# Patient Record
Sex: MALE | Race: WHITE | NOT HISPANIC OR LATINO | Employment: STUDENT | URBAN - NONMETROPOLITAN AREA
[De-identification: names, ages, dates, MRNs, and addresses within clinical notes are randomized per-mention and may not be internally consistent; named-entity substitution may affect disease eponyms.]

---

## 2018-04-16 ENCOUNTER — HOSPITAL ENCOUNTER (EMERGENCY)
Facility: HOSPITAL | Age: 23
Discharge: HOME | End: 2018-04-16
Attending: EMERGENCY MEDICINE | Admitting: EMERGENCY MEDICINE

## 2018-04-16 VITALS
DIASTOLIC BLOOD PRESSURE: 64 MMHG | RESPIRATION RATE: 16 BRPM | HEART RATE: 71 BPM | BODY MASS INDEX: 22.12 KG/M2 | OXYGEN SATURATION: 100 % | HEIGHT: 71 IN | WEIGHT: 158 LBS | SYSTOLIC BLOOD PRESSURE: 115 MMHG | TEMPERATURE: 98.7 F

## 2018-04-16 DIAGNOSIS — J06.9 VIRAL UPPER RESPIRATORY TRACT INFECTION: Primary | ICD-10-CM

## 2018-04-16 DIAGNOSIS — J30.9 ACUTE ALLERGIC RHINITIS, UNSPECIFIED SEASONALITY, UNSPECIFIED TRIGGER: ICD-10-CM

## 2018-04-16 LAB — S PYO AG THROAT QL: NEGATIVE

## 2018-04-16 PROCEDURE — 87081 CULTURE SCREEN ONLY: CPT | Performed by: EMERGENCY MEDICINE

## 2018-04-16 PROCEDURE — 87880 STREP A ASSAY W/OPTIC: CPT | Performed by: EMERGENCY MEDICINE

## 2018-04-16 PROCEDURE — 99283 EMERGENCY DEPT VISIT LOW MDM: CPT

## 2018-04-16 NOTE — ED PROVIDER NOTES
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  Some of his college roommates have similar symptoms.  He is a nonsmoker and typically very healthy.  No fever or chills myalgias, shortness of air or wheezing.  No rash, vomiting, diarrhea.  He has used some equate allergy medicine over-the-counter.  No recent out of country travel.  Minor scratchy sore throat with the postnasal drainage.            Review of Systems   All other systems reviewed and are negative.      History reviewed. No pertinent past medical history.    No Known Allergies    History reviewed. No pertinent surgical history.    History reviewed. No pertinent family history.    Social History     Social History   • Marital status: Single     Social History Main Topics   • Smoking status: Never Smoker   • Smokeless tobacco: Never Used   • Alcohol use No   • Drug use: No     Other Topics Concern   • Not on file           Objective   Physical Exam   Constitutional: He appears well-developed and well-nourished. No distress.   HENT:   Head: Normocephalic and atraumatic.   Right Ear: External ear normal.   Left Ear: External ear normal.   Nose: Nose normal.   Mouth/Throat: Oropharynx is clear and moist. No oropharyngeal exudate.   No tenderness over the frontal or maxillary sinuses, face is nonswollen   Eyes: Conjunctivae and EOM are normal. Right eye exhibits no discharge. Left eye exhibits no discharge. No scleral icterus.   Neck: Normal range of motion.   Cardiovascular: Normal rate and regular rhythm.    Pulmonary/Chest: Effort normal and breath sounds normal. No respiratory distress. He has no wheezes. He has no rales.   Abdominal: He exhibits no distension.   Musculoskeletal: Normal range of motion. He exhibits no edema.   Lymphadenopathy:     He has no cervical adenopathy.   Neurological: He is alert. No cranial  nerve deficit. He exhibits normal muscle tone. Coordination normal.   Skin: Skin is warm and dry. Capillary refill takes less than 2 seconds. No rash noted. He is not diaphoretic.   Psychiatric: He has a normal mood and affect. His behavior is normal. Thought content normal.       Procedures         ED Course  ED Course                  MDM    Final diagnoses:   Viral upper respiratory tract infection   Acute allergic rhinitis, unspecified seasonality, unspecified trigger            Luis Carlos Cota PA-C  04/16/18 1928

## 2018-04-16 NOTE — DISCHARGE INSTRUCTIONS
Try to get as much rest as possible over the next week or 2 and increase her water consumption to at least 10 glasses of water a day.  You may use Sudafed-pseudoephedrine for nasal congestion as needed, Zyrtec over-the-counter for any allergies or usually existing allergy medication.  Delsym as needed for any cough.  If your symptoms persist beyond another 10 days make sure you have reevaluation by your doctor or any other health provider

## 2018-04-18 LAB — BACTERIA SPEC AEROBE CULT: NORMAL

## 2018-10-04 ENCOUNTER — HOSPITAL ENCOUNTER (EMERGENCY)
Facility: HOSPITAL | Age: 23
Discharge: HOME | End: 2018-10-04
Attending: EMERGENCY MEDICINE | Admitting: EMERGENCY MEDICINE

## 2018-10-04 VITALS
SYSTOLIC BLOOD PRESSURE: 119 MMHG | OXYGEN SATURATION: 96 % | RESPIRATION RATE: 16 BRPM | TEMPERATURE: 99.1 F | WEIGHT: 159.4 LBS | DIASTOLIC BLOOD PRESSURE: 70 MMHG | BODY MASS INDEX: 22.31 KG/M2 | HEIGHT: 71 IN | HEART RATE: 98 BPM

## 2018-10-04 DIAGNOSIS — J06.9 UPPER RESPIRATORY TRACT INFECTION, UNSPECIFIED TYPE: ICD-10-CM

## 2018-10-04 DIAGNOSIS — J02.9 PHARYNGITIS, UNSPECIFIED ETIOLOGY: Primary | ICD-10-CM

## 2018-10-04 LAB — S PYO AG THROAT QL: NEGATIVE

## 2018-10-04 PROCEDURE — 87880 STREP A ASSAY W/OPTIC: CPT | Performed by: EMERGENCY MEDICINE

## 2018-10-04 PROCEDURE — 87081 CULTURE SCREEN ONLY: CPT | Performed by: EMERGENCY MEDICINE

## 2018-10-04 PROCEDURE — 99283 EMERGENCY DEPT VISIT LOW MDM: CPT

## 2018-10-04 RX ORDER — FLUTICASONE PROPIONATE 50 MCG
2 SPRAY, SUSPENSION (ML) NASAL DAILY
Qty: 16 G | Refills: 0 | Status: SHIPPED | OUTPATIENT
Start: 2018-10-04 | End: 2019-11-06

## 2018-10-04 RX ORDER — FEXOFENADINE HCL AND PSEUDOEPHEDRINE HCI 180; 240 MG/1; MG/1
1 TABLET, EXTENDED RELEASE ORAL DAILY
Qty: 10 TABLET | Refills: 0 | Status: SHIPPED | OUTPATIENT
Start: 2018-10-04 | End: 2020-06-22 | Stop reason: SDDI

## 2018-10-04 RX ORDER — BENZONATATE 100 MG/1
200 CAPSULE ORAL 3 TIMES DAILY PRN
Qty: 30 CAPSULE | Refills: 0 | Status: SHIPPED | OUTPATIENT
Start: 2018-10-04 | End: 2018-10-09

## 2018-10-04 NOTE — ED PROVIDER NOTES
Subjective   This patient's had a sore throat, nasal congestion, cough and developed hoarseness today.  His symptoms were going on since Monday.  He is only taking Tylenol prior to arrival.  He denies fever.  He states there is someone on campus who has mononucleosis however he has not had much contact with them.            Review of Systems   All other systems reviewed and are negative.      History reviewed. No pertinent past medical history.    No Known Allergies    History reviewed. No pertinent surgical history.    History reviewed. No pertinent family history.    Social History     Social History   • Marital status: Single     Social History Main Topics   • Smoking status: Never Smoker   • Smokeless tobacco: Never Used   • Alcohol use No   • Drug use: No     Other Topics Concern   • Not on file           Objective   Physical Exam   Constitutional: He appears well-developed and well-nourished.   HENT:   Head: Normocephalic and atraumatic.   Right Ear: External ear normal. Tympanic membrane is not injected, not erythematous and not bulging.   Left Ear: External ear normal. Tympanic membrane is not injected, not erythematous and not bulging.   Mouth/Throat: Uvula is midline. Posterior oropharyngeal erythema present. No oropharyngeal exudate, posterior oropharyngeal edema or tonsillar abscesses. Tonsils are 0 on the right. Tonsils are 0 on the left.   Neck: Normal range of motion and full passive range of motion without pain.   Cardiovascular: Normal rate and regular rhythm.    Pulmonary/Chest: Effort normal and breath sounds normal. He has no wheezes.   Musculoskeletal: Normal range of motion.   Lymphadenopathy:     He has no cervical adenopathy.   Neurological: He is alert.   Skin: No rash noted.   Psychiatric: He has a normal mood and affect. His behavior is normal. Judgment and thought content normal.       Procedures           ED Course                  MDM      Final diagnoses:   Pharyngitis, unspecified  etiology   Upper respiratory tract infection, unspecified type            Ruben Erazo PA-C  10/04/18 1412       Ruben Erazo PA-C  10/04/18 1443

## 2018-10-06 LAB — BACTERIA SPEC AEROBE CULT: NORMAL

## 2019-04-04 ENCOUNTER — HOSPITAL ENCOUNTER (EMERGENCY)
Facility: HOSPITAL | Age: 24
Discharge: HOME | End: 2019-04-04
Attending: EMERGENCY MEDICINE | Admitting: EMERGENCY MEDICINE

## 2019-04-04 VITALS
TEMPERATURE: 98.3 F | DIASTOLIC BLOOD PRESSURE: 68 MMHG | SYSTOLIC BLOOD PRESSURE: 109 MMHG | HEART RATE: 102 BPM | RESPIRATION RATE: 16 BRPM | HEIGHT: 71 IN | WEIGHT: 155 LBS | OXYGEN SATURATION: 96 % | BODY MASS INDEX: 21.7 KG/M2

## 2019-04-04 DIAGNOSIS — B34.9 VIRAL ILLNESS: Primary | ICD-10-CM

## 2019-04-04 LAB
FLUAV AG NPH QL: NEGATIVE
FLUBV AG NPH QL IA: NEGATIVE
S PYO AG THROAT QL: NEGATIVE

## 2019-04-04 PROCEDURE — 87880 STREP A ASSAY W/OPTIC: CPT | Performed by: PHYSICIAN ASSISTANT

## 2019-04-04 PROCEDURE — 87804 INFLUENZA ASSAY W/OPTIC: CPT | Performed by: PHYSICIAN ASSISTANT

## 2019-04-04 PROCEDURE — 99283 EMERGENCY DEPT VISIT LOW MDM: CPT

## 2019-04-04 PROCEDURE — 87081 CULTURE SCREEN ONLY: CPT | Performed by: PHYSICIAN ASSISTANT

## 2019-04-04 RX ORDER — ACETAMINOPHEN 325 MG/1
650 TABLET ORAL ONCE
Status: COMPLETED | OUTPATIENT
Start: 2019-04-04 | End: 2019-04-04

## 2019-04-04 RX ORDER — ONDANSETRON 4 MG/1
4 TABLET, ORALLY DISINTEGRATING ORAL EVERY 8 HOURS PRN
Qty: 8 TABLET | Refills: 0 | Status: SHIPPED | OUTPATIENT
Start: 2019-04-04 | End: 2019-11-06

## 2019-04-04 RX ORDER — IBUPROFEN 600 MG/1
600 TABLET ORAL EVERY 8 HOURS PRN
Qty: 12 TABLET | Refills: 0 | Status: SHIPPED | OUTPATIENT
Start: 2019-04-04 | End: 2019-11-06

## 2019-04-04 RX ORDER — SENNOSIDES 8.6 MG
650 CAPSULE ORAL EVERY 8 HOURS PRN
Qty: 12 TABLET | Refills: 0 | Status: SHIPPED | OUTPATIENT
Start: 2019-04-04 | End: 2019-11-06

## 2019-04-04 RX ADMIN — ACETAMINOPHEN 650 MG: 325 TABLET ORAL at 13:00

## 2019-04-04 NOTE — ED PROVIDER NOTES
Subjective   Patient is here with complaint of some subjective fever myalgias some sore throat intermittently for the past 2 days patient says he was around someone who did test positive for influenza.  He denies any chest pain shortness of air no abdominal pain no nausea no vomiting occasional cough reported no hemoptysis reported no other systemic complaints        History provided by:  Patient      Review of Systems   Constitutional: Positive for fever.   HENT: Positive for congestion and sore throat.    Eyes: Negative.    Respiratory: Positive for cough. Negative for shortness of breath.    Cardiovascular: Negative.    Gastrointestinal: Negative.  Negative for abdominal pain, nausea and vomiting.   Genitourinary: Negative.    Musculoskeletal: Positive for myalgias. Negative for neck pain and neck stiffness.   Skin: Negative.    Neurological: Negative.    Psychiatric/Behavioral: Negative.    All other systems reviewed and are negative.      History reviewed. No pertinent past medical history.    No Known Allergies    History reviewed. No pertinent surgical history.    History reviewed. No pertinent family history.    Social History     Socioeconomic History   • Marital status: Single     Spouse name: Not on file   • Number of children: Not on file   • Years of education: Not on file   • Highest education level: Not on file   Tobacco Use   • Smoking status: Never Smoker   • Smokeless tobacco: Never Used   Substance and Sexual Activity   • Alcohol use: No   • Drug use: No           Objective   Physical Exam   Constitutional: He is oriented to person, place, and time. He appears well-developed and well-nourished.   Afebrile nontoxic no acute distress   HENT:   Head: Normocephalic and atraumatic.   Right Ear: External ear normal.   Left Ear: External ear normal.   Mouth/Throat: Oropharynx is clear and moist.   Eyes: Conjunctivae and EOM are normal. Pupils are equal, round, and reactive to light.   Neck: Normal range  of motion. Neck supple.   No meningismus   Cardiovascular: Regular rhythm, normal heart sounds and intact distal pulses.   Pulmonary/Chest: Effort normal and breath sounds normal.   Abdominal: Soft. Bowel sounds are normal. There is no tenderness.   Musculoskeletal: Normal range of motion. He exhibits no edema.   Lymphadenopathy:     He has no cervical adenopathy.   Neurological: He is alert and oriented to person, place, and time. No cranial nerve deficit or sensory deficit. He exhibits normal muscle tone. Coordination normal.   Skin: Skin is warm and dry. Capillary refill takes less than 2 seconds.   Psychiatric: He has a normal mood and affect. His behavior is normal. Judgment and thought content normal.   Nursing note and vitals reviewed.      Procedures           ED Course  ED Course as of Apr 04 1324   Thu Apr 04, 2019   1303 Patient sitting up resting comfortably no distress  [SC]   1317 Patient's influenza and strep test negative however patient symptoms likely viral, with exposure to someone with influenza will plan on discharging home school excuse for today and tomorrow prescriptions for Tylenol Motrin as needed patient to follow-up with PCP or return here if there is any worsening symptoms pains fevers concerns despite medication patient agreeable with plan  [SC]      ED Course User Index  [SC] Nirav Morgan PA-C                  [facility]  Number of Diagnoses or Management Options     Amount and/or Complexity of Data Reviewed  Review and summarize past medical records: yes  Discuss the patient with other providers: yes    Risk of Complications, Morbidity, and/or Mortality  Presenting problems: low  Diagnostic procedures: low  Management options: low          Final diagnoses:   Viral illness            Nirav Morgan PA-C  04/04/19 [id number]

## 2019-04-04 NOTE — DISCHARGE INSTRUCTIONS
Rest increase fluids water, juices, take Tylenol and/or Motrin as needed for body aches, return to the ER if you have any worsening symptoms or concerns despite medication

## 2019-04-06 LAB — BACTERIA SPEC AEROBE CULT: NORMAL

## 2019-04-23 ENCOUNTER — HOSPITAL ENCOUNTER (EMERGENCY)
Facility: HOSPITAL | Age: 24
Discharge: HOME | End: 2019-04-23
Attending: EMERGENCY MEDICINE | Admitting: EMERGENCY MEDICINE

## 2019-04-23 ENCOUNTER — APPOINTMENT (OUTPATIENT)
Dept: ULTRASOUND IMAGING | Facility: HOSPITAL | Age: 24
End: 2019-04-23

## 2019-04-23 VITALS
HEIGHT: 71 IN | DIASTOLIC BLOOD PRESSURE: 68 MMHG | WEIGHT: 157.2 LBS | RESPIRATION RATE: 16 BRPM | SYSTOLIC BLOOD PRESSURE: 118 MMHG | HEART RATE: 81 BPM | BODY MASS INDEX: 22.01 KG/M2 | OXYGEN SATURATION: 99 % | TEMPERATURE: 98 F

## 2019-04-23 DIAGNOSIS — N45.1 EPIDIDYMITIS: Primary | ICD-10-CM

## 2019-04-23 LAB
BILIRUB UR QL STRIP: NEGATIVE
CLARITY UR: CLEAR
COLOR UR: YELLOW
GLUCOSE UR STRIP-MCNC: NEGATIVE MG/DL
HGB UR QL STRIP.AUTO: NEGATIVE
KETONES UR QL STRIP: NEGATIVE
LEUKOCYTE ESTERASE UR QL STRIP.AUTO: NEGATIVE
NITRITE UR QL STRIP: NEGATIVE
PH UR STRIP.AUTO: 7 [PH] (ref 5–8)
PROT UR QL STRIP: NEGATIVE
SP GR UR STRIP: <=1.005 (ref 1–1.03)
UROBILINOGEN UR QL STRIP: NORMAL

## 2019-04-23 PROCEDURE — 96372 THER/PROPH/DIAG INJ SC/IM: CPT

## 2019-04-23 PROCEDURE — 87591 N.GONORRHOEAE DNA AMP PROB: CPT | Performed by: EMERGENCY MEDICINE

## 2019-04-23 PROCEDURE — 99283 EMERGENCY DEPT VISIT LOW MDM: CPT

## 2019-04-23 PROCEDURE — 81003 URINALYSIS AUTO W/O SCOPE: CPT | Performed by: EMERGENCY MEDICINE

## 2019-04-23 PROCEDURE — 25010000002 CEFTRIAXONE PER 250 MG: Performed by: EMERGENCY MEDICINE

## 2019-04-23 PROCEDURE — 87491 CHLMYD TRACH DNA AMP PROBE: CPT | Performed by: EMERGENCY MEDICINE

## 2019-04-23 PROCEDURE — 76870 US EXAM SCROTUM: CPT

## 2019-04-23 RX ORDER — CEFTRIAXONE SODIUM 250 MG/1
250 INJECTION, POWDER, FOR SOLUTION INTRAMUSCULAR; INTRAVENOUS ONCE
Status: COMPLETED | OUTPATIENT
Start: 2019-04-23 | End: 2019-04-23

## 2019-04-23 RX ORDER — DOXYCYCLINE HYCLATE 100 MG/1
100 CAPSULE ORAL 2 TIMES DAILY
Qty: 13 CAPSULE | Refills: 0 | Status: SHIPPED | OUTPATIENT
Start: 2019-04-23 | End: 2019-11-06

## 2019-04-23 RX ORDER — LIDOCAINE HYDROCHLORIDE 10 MG/ML
0.9 INJECTION, SOLUTION EPIDURAL; INFILTRATION; INTRACAUDAL; PERINEURAL ONCE
Status: COMPLETED | OUTPATIENT
Start: 2019-04-23 | End: 2019-04-23

## 2019-04-23 RX ORDER — DOXYCYCLINE 100 MG/1
100 CAPSULE ORAL ONCE
Status: COMPLETED | OUTPATIENT
Start: 2019-04-23 | End: 2019-04-23

## 2019-04-23 RX ADMIN — DOXYCYCLINE 100 MG: 100 CAPSULE ORAL at 13:57

## 2019-04-23 RX ADMIN — LIDOCAINE HYDROCHLORIDE 0.9 ML: 10 INJECTION, SOLUTION EPIDURAL; INFILTRATION; INTRACAUDAL; PERINEURAL at 13:56

## 2019-04-23 RX ADMIN — CEFTRIAXONE SODIUM 250 MG: 250 INJECTION, POWDER, FOR SOLUTION INTRAMUSCULAR; INTRAVENOUS at 13:57

## 2019-04-25 LAB
C TRACH RRNA SPEC DONR QL NAA+PROBE: NEGATIVE
N GONORRHOEA DNA SPEC QL NAA+PROBE: NEGATIVE

## 2020-02-20 ENCOUNTER — OFFICE VISIT (OUTPATIENT)
Dept: PSYCHIATRY | Facility: CLINIC | Age: 25
End: 2020-02-20

## 2020-02-20 VITALS
BODY MASS INDEX: 23.19 KG/M2 | HEART RATE: 83 BPM | DIASTOLIC BLOOD PRESSURE: 62 MMHG | SYSTOLIC BLOOD PRESSURE: 118 MMHG | WEIGHT: 162 LBS | HEIGHT: 70 IN

## 2020-02-20 DIAGNOSIS — F90.9 ADULT ADHD: Primary | ICD-10-CM

## 2020-02-20 PROCEDURE — 90792 PSYCH DIAG EVAL W/MED SRVCS: CPT | Performed by: NURSE PRACTITIONER

## 2020-02-20 RX ORDER — ATOMOXETINE 40 MG/1
40 CAPSULE ORAL DAILY
Qty: 30 CAPSULE | Refills: 0 | Status: SHIPPED | OUTPATIENT
Start: 2020-02-20 | End: 2020-06-22

## 2020-03-23 ENCOUNTER — OFFICE VISIT (OUTPATIENT)
Dept: PSYCHIATRY | Facility: CLINIC | Age: 25
End: 2020-03-23

## 2020-03-23 VITALS — BODY MASS INDEX: 22.9 KG/M2 | WEIGHT: 160 LBS | HEIGHT: 70 IN

## 2020-03-23 DIAGNOSIS — F90.9 ADULT ADHD: Primary | ICD-10-CM

## 2020-03-23 PROCEDURE — 99213 OFFICE O/P EST LOW 20 MIN: CPT | Performed by: NURSE PRACTITIONER

## 2020-03-23 RX ORDER — DEXTROAMPHETAMINE SACCHARATE, AMPHETAMINE ASPARTATE, DEXTROAMPHETAMINE SULFATE AND AMPHETAMINE SULFATE 1.25; 1.25; 1.25; 1.25 MG/1; MG/1; MG/1; MG/1
5 TABLET ORAL DAILY
Qty: 30 TABLET | Refills: 0 | Status: SHIPPED | OUTPATIENT
Start: 2020-03-23 | End: 2020-04-29 | Stop reason: SDUPTHER

## 2020-04-20 NOTE — PROGRESS NOTES
Subjective   Yobani Singer is a 24 y.o. male who presents today for initial evaluation     Chief Complaint:  Poor focus    History of Present Illness:  Accompanied by:Self  This is a 24-year-old male who presents for initial evaluation.  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  He said that he is concerned that he may have ADHD.  He said that he is getting A's and B's for the most part but that is he is gotten into more intense courses he had trouble focusing and listening and this in turn impact his grades.  He tells me that when he was in grade school he was always accused of not applying himself and he said that he thought he was but he would not get the best grades.  He said he would constantly be staring out the window.  He was easily bored and is still easily bored.  He said that he has been noticing sometimes with conversations with friends lately that he loses focus and they have to repeat what ever saying.  He went to high school at [employer] and graduated there and then has been going to  [employer].  His mom and dad live in [place] and he has a half-brother and half-sister there.  He said that he had a good upbringing and that they are very supportive.  He has a hard time with sleep and will take melatonin which does help.  He denies any symptoms of depression.  He said that he is been noticing lately that he feels much more anxious.  He thinks he feels anxious because he cannot focus and is starting to concern him.  He said that in high school he had similar problems but that the work was not very difficult for him and so he was able to accomplish it, but now is noticing more trouble doing that.      Current Psychiatric Medications:  None    Prior Psychiatric Medications:  None    Currently in Counseling or Therapy:  No    Prior Psychiatric Outpatient Care:  None    Prior Psychiatric Hospitalizations:  None    Previous Suicide Attempts:  Denies    Any family history of  suicide attempts:  Denies    Previous Self-Harming Behavior: Denies    Legal History, Arrests, or Incarcerations:  No current legal charges pending.      Violent Tendencies:  None    Developmental History:  Born in [place] and raised in [place].  His parents have been  and he had a very good upbringing.  As indicated he graduated from [employer] and is now in his kyler year at [employer].  He is studying clinical science and marketing.      History of Seizures or TBI:  Denies    Highest Level of Education:  Currently a kyler in college      Employment:  Part-time     History:  No    Social History:  Lives with 2 [employer] male roommates.  He does not smoke.  He will vape if he drinks alcohol and he drinks alcohol occasionally on weekends.  He denies any illicit drug usage.  He denies any history of narcotic abuse.      Last Menstrual Period:  NA    Abuse History:  Verbal: no  Emotional: no  Mental: no  Physical: no  Sexual: no  Rape: no  Other: Denies      Patient's Support Network Includes:  father, mother and Two good friends      The following portions of the patient's history were reviewed and updated as appropriate: allergies, current medications, past family history, past medical history, past social history, past surgical history and problem list.      Past Medical History:  History reviewed. No pertinent past medical history.    Social History:  Social History     Socioeconomic History   • Marital status: Single     Spouse name: Not on file   • Number of children: Not on file   • Years of education: Not on file   • Highest education level: Not on file   Tobacco Use   • Smoking status: Never Smoker   • Smokeless tobacco: Never Used   Substance and Sexual Activity   • Alcohol use: Yes   • Drug use: No       Family History:  History reviewed. No pertinent family history.    Past Surgical History:  History reviewed. No pertinent surgical history.    Problem List:  There  "is no problem list on file for this patient.      Allergy:   Allergies   Allergen Reactions   • Azithromycin Hives        Current Medications:   Current Outpatient Medications   Medication Sig Dispense Refill   • amphetamine-dextroamphetamine (Adderall) 5 MG tablet Take 1 tablet by mouth Daily. 30 tablet 0   • atomoxetine (STRATTERA) 40 MG capsule Take 1 capsule by mouth Daily. 30 capsule 0   • brompheniramine-pseudoephedrine-DM (BROMFED DM) 30-2-10 MG/5ML syrup Take 1 teaspoonful (5mL) by mouth 4 (Four) Times a Day As Needed for Congestion or Cough. 119 mL 0   • fexofenadine-pseudoephedrine (ALLEGRA-D 24) 180-240 MG per 24 hr tablet Take 1 tablet by mouth Daily. 10 tablet 0   • loratadine (CLARITIN) 10 MG tablet Take  by mouth.       No current facility-administered medications for this visit.        Review of Symptoms:    Review of Systems   Psychiatric/Behavioral: Positive for decreased concentration and sleep disturbance. Negative for self-injury, suicidal ideas and depressed mood. The patient is nervous/anxious.          Physical Exam:   Blood pressure 118/62, pulse 83, height 177.8 cm (70\"), weight 73.5 kg (162 lb). Body mass index is 23.24 kg/m².     Physical Exam   Constitutional: He appears well-developed and well-nourished.   Nursing note and vitals reviewed.        Mental Status Exam:   Hygiene:   good  Cooperation:  Cooperative  Eye Contact:  Good  Psychomotor Behavior:  Appropriate  Affect:  Appropriate  Mood: normal  Hopelessness: Denies  Speech:  Normal  Thought Process:  Goal directed and Linear  Thought Content:  Normal  Suicidal:  None  Homicidal:  None  Hallucinations:  None  Delusion:  None  Memory:  Intact  Orientation:  Person, Place, Time and Situation  Reliability:  good  Insight:  Good  Judgement:  Good  Impulse Control:  Good  Physical/Medical Issues:  No        Lab Results:   No visits with results within 1 Month(s) from this visit.   Latest known visit with results is:   Admission on " 04/23/2019, Discharged on 04/23/2019   Component Date Value Ref Range Status   • Color, UA 04/23/2019 Yellow  Yellow, Straw Final   • Appearance, UA 04/23/2019 Clear  Clear Final   • pH, UA 04/23/2019 7.0  5.0 - 8.0 Final   • Specific Gravity, UA 04/23/2019 <=1.005  1.005 - 1.030 Final   • Glucose, UA 04/23/2019 Negative  Negative Final   • Ketones, UA 04/23/2019 Negative  Negative Final   • Bilirubin, UA 04/23/2019 Negative  Negative Final   • Blood, UA 04/23/2019 Negative  Negative Final   • Protein, UA 04/23/2019 Negative  Negative Final   • Leuk Esterase, UA 04/23/2019 Negative  Negative Final   • Nitrite, UA 04/23/2019 Negative  Negative Final   • Urobilinogen, UA 04/23/2019 0.2 E.U./dL  0.2 - 1.0 E.U./dL Final   • Chlamydia trachomatis, ANGELICA 04/23/2019 Negative  Negative Final   • Neisseria gonorrhoeae, ANGELICA 04/23/2019 Negative  Negative Final       Assessment/Plan   Diagnoses and all orders for this visit:    Adult ADHD  -     atomoxetine (STRATTERA) 40 MG capsule; Take 1 capsule by mouth Daily.        Visit Diagnoses:    ICD-10-CM ICD-9-CM   1. Adult ADHD F90.9 314.01     I have been I I do think that in talking with Oscar he probably has some attention deficit disorder.  He has managed it well until he is gotten into some of his harder college courses.  We will start with Strattera 40 mg daily.  He will take note if it helps his symptoms and also if he has any side effects.  He will return to clinic in 1 month for recheck, sooner if he is having problems.    GOALS:  Short Term Goals: Patient will be compliant with medication, and patient will have no significant medication related side effects.  Patient will be engaged in psychotherapy as indicated.  Patient will report subjective improvement of symptoms.  Long term goals: To stabilize mood and treat/improve subjective symptoms, the patient will stay out of the hospital, the patient will be at an optimal level of functioning, and the patient will take all  medications as prescribed.  The patient verbalized understanding and agreement with goals that were mutually set.      TREATMENT PLAN: Continue supportive psychotherapy efforts and medications as indicated. .  Medication and treatment options, both pharmacological and non-pharmacological treatment options, discussed during today's visit. Patient/Guardian acknowledged and verbally consented with current treatment plan and was educated on the importance of compliance with treatment and follow-up appointments.        MEDICATION ISSUES:    Discussed treatment plan and medication options of prescribed medication as well as the risks, benefits, any black box warnings, and side effects including potential falls, possible impaired driving, and metabolic adversities among others. Patient is agreeable to call the office with any worsening of symptoms or onset of side effects, or if any concerns or questions arise.  The contact information for the office is made available to the patient. Patient is agreeable to call 911 or go to the nearest ER should they begin having any SI/HI, or if any urgent concerns arise. No medication side effects or related complaints today.     MEDS ORDERED DURING VISIT:  New Medications Ordered This Visit   Medications   • atomoxetine (STRATTERA) 40 MG capsule     Sig: Take 1 capsule by mouth Daily.     Dispense:  30 capsule     Refill:  0       Return in about 4 weeks (around 3/19/2020) for Recheck.           Functional Status: Mild impairment     Prognosis: Good with Ongoing Treatment             This document has been electronically signed by CAROL Bautista  April 20, 2020 17:22    Please note that portions of this note were completed with a voice recognition program. Efforts were made to edit dictation, but occasionally words are mistranscribed.

## 2020-04-29 DIAGNOSIS — F90.9 ADULT ADHD: ICD-10-CM

## 2020-04-29 RX ORDER — DEXTROAMPHETAMINE SACCHARATE, AMPHETAMINE ASPARTATE, DEXTROAMPHETAMINE SULFATE AND AMPHETAMINE SULFATE 1.25; 1.25; 1.25; 1.25 MG/1; MG/1; MG/1; MG/1
5 TABLET ORAL DAILY
Qty: 30 TABLET | Refills: 0 | Status: SHIPPED | OUTPATIENT
Start: 2020-04-29 | End: 2020-06-17 | Stop reason: SDUPTHER

## 2020-04-29 NOTE — TELEPHONE ENCOUNTER
PT SAID HE WAS TOLD TO CALL IN A MONTH AND UPDATE ON HOW HE IS DOING ON MEDICATION (ADDERALL). PT SAYS HE IS DOING GOOD ON MED AND IT SEEMS TO REALLY HELP. HE SAID 5 MG IS THE PERFECT AMOUNT AND WANTS TO CONTINUE TAKING THIS DOSE. COULD YOU SEND IN REFILL TO [facility]? THANKS

## 2020-05-04 NOTE — PROGRESS NOTES
Subjective   Yobani Singer is a 24 y.o. male who presents today for initial evaluation     Chief Complaint:  Poor focus    History of Present Illness:  Accompanied by:Self  This is a 24-year-old male who presents for follow-up, he recently established care about a month ago..  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  He said that he is concerned that he may have ADHD.  He said that he is getting A's and B's for the most part but that is he is gotten into more intense courses he had trouble focusing and listening and this in turn impact his grades.  He tells me that when he was in grade school he was always accused of not applying himself and he said that he thought he was but he would not get the best grades.  He said he would constantly be staring out the window.  He was easily bored and is still easily bored.  He said that he has been noticing sometimes with conversations with friends lately that he loses focus and they have to repeat what ever saying.  He went to high school at [employer] and graduated there and then has been going to [employer].  His mom and dad live in [place] and he has a half-brother and half-sister there.  He said that he had a good upbringing and that they are very supportive.  He has a hard time with sleep and will take melatonin which does help.  He denies any symptoms of depression.  He said that he is been noticing lately that he feels much more anxious.  He thinks he feels anxious because he cannot focus and is starting to concern him.  He said that in high school he had similar problems but that the work was not very difficult for him and so he was able to accomplish it, but now is noticing more trouble doing that.    Last month we started Strattera 40 mg and he states that he saw no improvement with it.  He continues to have difficulty concentrating and keeping himself organized.  He said that with the coronavirus he is now going to do things online  and that makes him even though a little more nervous.  He is afraid that he might have more trouble concentrating.  He denies any significant depression.  He said he is a little anxious but mainly because of the virus and the impact it may have on his school.  He is going to go home and stay with his parents.    Current Psychiatric Medications:  None    Prior Psychiatric Medications:  None    Currently in Counseling or Therapy:  No    Prior Psychiatric Outpatient Care:  None    Prior Psychiatric Hospitalizations:  None    Previous Suicide Attempts:  Denies    Any family history of suicide attempts:  Denies    Previous Self-Harming Behavior: Denies    Legal History, Arrests, or Incarcerations:  No current legal charges pending.      Violent Tendencies:  None    Developmental History:  Born in [place] and raised in [place].  His parents have been  and he had a very good upbringing.  As indicated he graduated from [employer] and is now in his kyler year at [employer].  He is studying clinical science and marketing.      History of Seizures or TBI:  Denies    Highest Level of Education:  Currently a kyler in college      Employment:  Part-time     History:  No    Social History:  Lives with 2 [employer] male roommates.  He does not smoke.  He will vape if he drinks alcohol and he drinks alcohol occasionally on weekends.  He denies any illicit drug usage.  He denies any history of narcotic abuse.      Last Menstrual Period:  NA    Abuse History:  Verbal: no  Emotional: no  Mental: no  Physical: no  Sexual: no  Rape: no  Other: Denies      Patient's Support Network Includes:  father, mother and Two good friends      The following portions of the patient's history were reviewed and updated as appropriate: allergies, current medications, past family history, past medical history, past social history, past surgical history and problem list.      Past Medical History:  No past medical  "history on file.    Social History:  Social History     Socioeconomic History   • Marital status: Single     Spouse name: Not on file   • Number of children: Not on file   • Years of education: Not on file   • Highest education level: Not on file   Tobacco Use   • Smoking status: Never Smoker   • Smokeless tobacco: Never Used   Substance and Sexual Activity   • Alcohol use: Yes   • Drug use: No       Family History:  No family history on file.    Past Surgical History:  No past surgical history on file.    Problem List:  There is no problem list on file for this patient.      Allergy:   Allergies   Allergen Reactions   • Azithromycin Hives        Current Medications:   Current Outpatient Medications   Medication Sig Dispense Refill   • amphetamine-dextroamphetamine (Adderall) 5 MG tablet Take 1 tablet by mouth Daily. 30 tablet 0   • atomoxetine (STRATTERA) 40 MG capsule Take 1 capsule by mouth Daily. 30 capsule 0   • brompheniramine-pseudoephedrine-DM (BROMFED DM) 30-2-10 MG/5ML syrup Take 1 teaspoonful (5mL) by mouth 4 (Four) Times a Day As Needed for Congestion or Cough. 119 mL 0   • fexofenadine-pseudoephedrine (ALLEGRA-D 24) 180-240 MG per 24 hr tablet Take 1 tablet by mouth Daily. 10 tablet 0   • loratadine (CLARITIN) 10 MG tablet Take  by mouth.       No current facility-administered medications for this visit.        Review of Symptoms:    Review of Systems   Psychiatric/Behavioral: Positive for decreased concentration and sleep disturbance. Negative for self-injury, suicidal ideas and depressed mood. The patient is nervous/anxious.          Physical Exam:   Height 177.8 cm (70\"), weight 72.6 kg (160 lb). Body mass index is 22.96 kg/m².     Physical Exam   Constitutional: He appears well-developed and well-nourished.   Nursing note and vitals reviewed.        Mental Status Exam:   Hygiene:   good  Cooperation:  Cooperative  Eye Contact:  Good  Psychomotor Behavior:  Appropriate  Affect:  Appropriate  Mood: " normal  Hopelessness: Denies  Speech:  Normal  Thought Process:  Goal directed and Linear  Thought Content:  Normal  Suicidal:  None  Homicidal:  None  Hallucinations:  None  Delusion:  None  Memory:  Intact  Orientation:  Person, Place, Time and Situation  Reliability:  good  Insight:  Good  Judgement:  Good  Impulse Control:  Good  Physical/Medical Issues:  No        Lab Results:   No visits with results within 1 Month(s) from this visit.   Latest known visit with results is:   Admission on 04/23/2019, Discharged on 04/23/2019   Component Date Value Ref Range Status   • Color, UA 04/23/2019 Yellow  Yellow, Straw Final   • Appearance, UA 04/23/2019 Clear  Clear Final   • pH, UA 04/23/2019 7.0  5.0 - 8.0 Final   • Specific Gravity, UA 04/23/2019 <=1.005  1.005 - 1.030 Final   • Glucose, UA 04/23/2019 Negative  Negative Final   • Ketones, UA 04/23/2019 Negative  Negative Final   • Bilirubin, UA 04/23/2019 Negative  Negative Final   • Blood, UA 04/23/2019 Negative  Negative Final   • Protein, UA 04/23/2019 Negative  Negative Final   • Leuk Esterase, UA 04/23/2019 Negative  Negative Final   • Nitrite, UA 04/23/2019 Negative  Negative Final   • Urobilinogen, UA 04/23/2019 0.2 E.U./dL  0.2 - 1.0 E.U./dL Final   • Chlamydia trachomatis, ANGELICA 04/23/2019 Negative  Negative Final   • Neisseria gonorrhoeae, ANGELICA 04/23/2019 Negative  Negative Final       Assessment/Plan   Diagnoses and all orders for this visit:    Adult ADHD  -     Discontinue: amphetamine-dextroamphetamine (Adderall) 5 MG tablet; Take 1 tablet by mouth Daily.      His UDS was normal last time.  I will have him sign a controlled substance agreement.  I reviewed her Michael and it was negative.  I am going to start Adderall 5 mg once daily.  We talked about potential side effects.  He is going to take it in the morning.  Due to a virus, he is going to call me in a couple weeks to let me know how he thinks this is working for him.  At that time we will then  determine follow-up.  He understands that he needs to keep this medication safe and secure.    Visit Diagnoses:    ICD-10-CM ICD-9-CM   1. Adult ADHD F90.9 314.01         GOALS:  Short Term Goals: Patient will be compliant with medication, and patient will have no significant medication related side effects.  Patient will be engaged in psychotherapy as indicated.  Patient will report subjective improvement of symptoms.  Long term goals: To stabilize mood and treat/improve subjective symptoms, the patient will stay out of the hospital, the patient will be at an optimal level of functioning, and the patient will take all medications as prescribed.  The patient verbalized understanding and agreement with goals that were mutually set.      TREATMENT PLAN: Continue supportive psychotherapy efforts and medications as indicated. .  Medication and treatment options, both pharmacological and non-pharmacological treatment options, discussed during today's visit. Patient/Guardian acknowledged and verbally consented with current treatment plan and was educated on the importance of compliance with treatment and follow-up appointments.        MEDICATION ISSUES:    Discussed treatment plan and medication options of prescribed medication as well as the risks, benefits, any black box warnings, and side effects including potential falls, possible impaired driving, and metabolic adversities among others. Patient is agreeable to call the office with any worsening of symptoms or onset of side effects, or if any concerns or questions arise.  The contact information for the office is made available to the patient. Patient is agreeable to call 911 or go to the nearest ER should they begin having any SI/HI, or if any urgent concerns arise. No medication side effects or related complaints today.     MEDS ORDERED DURING VISIT:  No orders of the defined types were placed in this encounter.      Return in about 3 months (around 6/23/2020) for  Recheck.           Functional Status: Mild impairment     Prognosis: Good with Ongoing Treatment             This document has been electronically signed by CAROL Bautista  May 4, 2020 10:32    Please note that portions of this note were completed with a voice recognition program. Efforts were made to edit dictation, but occasionally words are mistranscribed.

## 2020-06-17 DIAGNOSIS — F90.9 ADULT ADHD: ICD-10-CM

## 2020-06-18 RX ORDER — DEXTROAMPHETAMINE SACCHARATE, AMPHETAMINE ASPARTATE, DEXTROAMPHETAMINE SULFATE AND AMPHETAMINE SULFATE 1.25; 1.25; 1.25; 1.25 MG/1; MG/1; MG/1; MG/1
5 TABLET ORAL DAILY
Qty: 30 TABLET | Refills: 0 | Status: SHIPPED | OUTPATIENT
Start: 2020-06-18 | End: 2020-07-13

## 2020-06-22 ENCOUNTER — OFFICE VISIT (OUTPATIENT)
Dept: PSYCHIATRY | Facility: CLINIC | Age: 25
End: 2020-06-22

## 2020-06-22 VITALS
RESPIRATION RATE: 18 BRPM | BODY MASS INDEX: 22.96 KG/M2 | TEMPERATURE: 99 F | SYSTOLIC BLOOD PRESSURE: 120 MMHG | HEIGHT: 70 IN | HEART RATE: 84 BPM | DIASTOLIC BLOOD PRESSURE: 74 MMHG

## 2020-06-22 DIAGNOSIS — F90.9 ADULT ADHD: Primary | ICD-10-CM

## 2020-06-22 PROCEDURE — 99213 OFFICE O/P EST LOW 20 MIN: CPT | Performed by: NURSE PRACTITIONER

## 2020-07-05 NOTE — PROGRESS NOTES
Subjective   Yobani Singer is a 24 y.o. male who presents today for initial evaluation     Chief Complaint:  Poor focus    History of Present Illness:  Accompanied by:Self  This is a 24-year-old male who presents for follow-up.  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 He did well in the spring after starting Adderall and is now taking a summer class.  He said that he is getting A's and B's for the most part but that is he is gotten into more intense courses he had trouble focusing and listening and this in turn impact his grades.  He tells me that when he was in grade school he was always accused of not applying himself and he said that he thought he was but he would not get the best grades.  He said he would constantly be staring out the window.  He was easily bored and is still easily bored.  He said that he has been noticing sometimes with conversations with friends lately that he loses focus and they have to repeat what ever saying. He said that improved with the Adderall. He went to high school at [employer] and graduated there and then has been going to [employer].  His mom and dad live in [place] and he has a half-brother and half-sister there.  He said that he had a good upbringing and that they are very supportive.  He has a hard time with sleep and will take melatonin which does help.  He denies any symptoms of depression.  He said that he is been noticing lately that he feels much more anxious.  He thinks he feels anxious because he cannot focus and is starting to concern him.  He said that in high school he had similar problems but that the work was not very difficult for him and so he was able to accomplish it, but now is noticing more trouble doing that.    We initially started Strattera 40 mg and he states that he saw no improvement with it.  He continues to have difficulty concentrating and keeping himself organized.  He said that with the coronavirus he is now going  to do things online and that makes him even though a little more nervous.  He is afraid that he might have more trouble concentrating.  He denies any significant depression.  He said he is a little anxious but mainly because of the virus and the impact it may have on his school.  He is home and staying with his parents. He is not sure what the fall will bring. He denies SI/HI/AH/VH.    Prior Psychiatric Hospitalizations:  None    Previous Suicide Attempts:  Denies    Any family history of suicide attempts:  Denies    Previous Self-Harming Behavior: Denies    Legal History, Arrests, or Incarcerations:  No current legal charges pending.      Violent Tendencies:  None    Developmental History:  Born in [facility] [place] [facility] and raised in [place].  His parents have been  and he had a very good upbringing.  As indicated he graduated from [employer] and is now in his kyler year at [employer].  He is studying clinical science and marketing.      History of Seizures or TBI:  Denies    Highest Level of Education:  Currently a kyler in college      Employment:  Part-time     History:  No    Social History:  Lives with 2 [employer] male roommates.  He does not smoke.  He will vape if he drinks alcohol and he drinks alcohol occasionally on weekends.  He denies any illicit drug usage.  He denies any history of narcotic abuse.      Last Menstrual Period:  NA    Abuse History:  Verbal: no  Emotional: no  Mental: no  Physical: no  Sexual: no  Rape: no  Other: Denies      Patient's Support Network Includes:  father, mother and Two good friends      The following portions of the patient's history were reviewed and updated as appropriate: allergies, current medications, past family history, past medical history, past social history, past surgical history and problem list.      Past Medical History:  Past Medical History:   Diagnosis Date   • ADHD (attention deficit hyperactivity disorder)        Social  "History:  Social History     Socioeconomic History   • Marital status: Single     Spouse name: Not on file   • Number of children: Not on file   • Years of education: Not on file   • Highest education level: Not on file   Tobacco Use   • Smoking status: Current Every Day Smoker     Types: Electronic Cigarette   • Smokeless tobacco: Never Used   Substance and Sexual Activity   • Alcohol use: Yes   • Drug use: No       Family History:  Family History   Problem Relation Age of Onset   • ADD / ADHD Neg Hx    • Alcohol abuse Neg Hx    • Anxiety disorder Neg Hx    • Bipolar disorder Neg Hx    • Dementia Neg Hx    • Depression Neg Hx    • Drug abuse Neg Hx    • OCD Neg Hx    • Paranoid behavior Neg Hx    • Schizophrenia Neg Hx    • Seizures Neg Hx    • Self-Injurious Behavior  Neg Hx    • Suicide Attempts Neg Hx        Past Surgical History:  History reviewed. No pertinent surgical history.    Problem List:  There is no problem list on file for this patient.      Allergy:   Allergies   Allergen Reactions   • Azithromycin Hives        Current Medications:   Current Outpatient Medications   Medication Sig Dispense Refill   • amphetamine-dextroamphetamine (Adderall) 5 MG tablet Take 1 tablet by mouth Daily. 30 tablet 0     No current facility-administered medications for this visit.        Review of Symptoms:    Review of Systems   Psychiatric/Behavioral: Positive for decreased concentration and sleep disturbance. Negative for self-injury, suicidal ideas and depressed mood. The patient is nervous/anxious.          Physical Exam:   Blood pressure 120/74, pulse 84, temperature 99 °F (37.2 °C), temperature source Temporal, resp. rate 18, height 177.8 cm (70\"). Body mass index is 22.96 kg/m².     Physical Exam   Constitutional: He appears well-developed and well-nourished.   Nursing note and vitals reviewed.        Mental Status Exam:   Hygiene:   good  Cooperation:  Cooperative  Eye Contact:  Good  Psychomotor Behavior:  " Appropriate  Affect:  Appropriate  Mood: normal  Hopelessness: Denies  Speech:  Normal  Thought Process:  Goal directed and Linear  Thought Content:  Normal  Suicidal:  None  Homicidal:  None  Hallucinations:  None  Delusion:  None  Memory:  Intact  Orientation:  Person, Place, Time and Situation  Reliability:  good  Insight:  Good  Judgement:  Good  Impulse Control:  Good  Physical/Medical Issues:  No        Lab Results:   No visits with results within 1 Month(s) from this visit.   Latest known visit with results is:   Admission on 04/23/2019, Discharged on 04/23/2019   Component Date Value Ref Range Status   • Color, UA 04/23/2019 Yellow  Yellow, Straw Final   • Appearance, UA 04/23/2019 Clear  Clear Final   • pH, UA 04/23/2019 7.0  5.0 - 8.0 Final   • Specific Gravity, UA 04/23/2019 <=1.005  1.005 - 1.030 Final   • Glucose, UA 04/23/2019 Negative  Negative Final   • Ketones, UA 04/23/2019 Negative  Negative Final   • Bilirubin, UA 04/23/2019 Negative  Negative Final   • Blood, UA 04/23/2019 Negative  Negative Final   • Protein, UA 04/23/2019 Negative  Negative Final   • Leuk Esterase, UA 04/23/2019 Negative  Negative Final   • Nitrite, UA 04/23/2019 Negative  Negative Final   • Urobilinogen, UA 04/23/2019 0.2 E.U./dL  0.2 - 1.0 E.U./dL Final   • Chlamydia trachomatis, ANGELICA 04/23/2019 Negative  Negative Final   • Neisseria gonorrhoeae, ANGELICA 04/23/2019 Negative  Negative Final       Assessment/Plan   Diagnoses and all orders for this visit:    Adult ADHD      His UDS was normal last time.  I am going to increase his Adderall to 10 mg.  We talked about potential side effects.  He is going to take it in the morning.  Due to a virus, he is going to call me in a couple weeks to let me know how he thinks this is working for him.  At that time we will then determine follow-up.  He understands that he needs to keep this medication safe and secure.    Visit Diagnoses:    ICD-10-CM ICD-9-CM   1. Adult ADHD F90.9 314.01          GOALS:  Short Term Goals: Patient will be compliant with medication, and patient will have no significant medication related side effects.  Patient will be engaged in psychotherapy as indicated.  Patient will report subjective improvement of symptoms.  Long term goals: To stabilize mood and treat/improve subjective symptoms, the patient will stay out of the hospital, the patient will be at an optimal level of functioning, and the patient will take all medications as prescribed.  The patient verbalized understanding and agreement with goals that were mutually set.      TREATMENT PLAN: Continue supportive psychotherapy efforts and medications as indicated. .  Medication and treatment options, both pharmacological and non-pharmacological treatment options, discussed during today's visit. Patient/Guardian acknowledged and verbally consented with current treatment plan and was educated on the importance of compliance with treatment and follow-up appointments.        MEDICATION ISSUES:    Discussed treatment plan and medication options of prescribed medication as well as the risks, benefits, any black box warnings, and side effects including potential falls, possible impaired driving, and metabolic adversities among others. Patient is agreeable to call the office with any worsening of symptoms or onset of side effects, or if any concerns or questions arise.  The contact information for the office is made available to the patient. Patient is agreeable to call 911 or go to the nearest ER should they begin having any SI/HI, or if any urgent concerns arise. No medication side effects or related complaints today.     MEDS ORDERED DURING VISIT:  No orders of the defined types were placed in this encounter.      Return in about 3 months (around 9/22/2020) for Recheck.           Functional Status: Mild impairment     Prognosis: Good with Ongoing Treatment             This document has been electronically signed by  Caitlin Gutiérrez, APRN  July 5, 2020 14:11    Please note that portions of this note were completed with a voice recognition program. Efforts were made to edit dictation, but occasionally words are mistranscribed.

## 2020-07-13 ENCOUNTER — TELEPHONE (OUTPATIENT)
Dept: PSYCHIATRY | Facility: CLINIC | Age: 25
End: 2020-07-13

## 2020-07-13 DIAGNOSIS — F90.9 ADULT ADHD: ICD-10-CM

## 2020-07-13 RX ORDER — DEXTROAMPHETAMINE SACCHARATE, AMPHETAMINE ASPARTATE, DEXTROAMPHETAMINE SULFATE AND AMPHETAMINE SULFATE 2.5; 2.5; 2.5; 2.5 MG/1; MG/1; MG/1; MG/1
10 TABLET ORAL DAILY
Qty: 30 TABLET | Refills: 0 | Status: SHIPPED | OUTPATIENT
Start: 2020-07-13 | End: 2020-08-17 | Stop reason: SDUPTHER

## 2020-07-13 NOTE — TELEPHONE ENCOUNTER
Pt called in requesting refill of adderall pt stated that he was told to finish 5mg and he could go up to 10 mg at next fill. P t is aware it will be wed. 7/15/2020

## 2020-08-17 DIAGNOSIS — F90.9 ADULT ADHD: ICD-10-CM

## 2020-08-17 RX ORDER — DEXTROAMPHETAMINE SACCHARATE, AMPHETAMINE ASPARTATE, DEXTROAMPHETAMINE SULFATE AND AMPHETAMINE SULFATE 2.5; 2.5; 2.5; 2.5 MG/1; MG/1; MG/1; MG/1
10 TABLET ORAL DAILY
Qty: 30 TABLET | Refills: 0 | Status: SHIPPED | OUTPATIENT
Start: 2020-08-17 | End: 2020-09-16 | Stop reason: SDUPTHER

## 2020-09-16 DIAGNOSIS — F90.9 ADULT ADHD: ICD-10-CM

## 2020-09-16 RX ORDER — DEXTROAMPHETAMINE SACCHARATE, AMPHETAMINE ASPARTATE, DEXTROAMPHETAMINE SULFATE AND AMPHETAMINE SULFATE 2.5; 2.5; 2.5; 2.5 MG/1; MG/1; MG/1; MG/1
10 TABLET ORAL DAILY
Qty: 30 TABLET | Refills: 0 | Status: SHIPPED | OUTPATIENT
Start: 2020-09-16 | End: 2020-10-15 | Stop reason: SDUPTHER

## 2020-10-15 DIAGNOSIS — F90.9 ADULT ADHD: ICD-10-CM

## 2020-10-15 RX ORDER — DEXTROAMPHETAMINE SACCHARATE, AMPHETAMINE ASPARTATE, DEXTROAMPHETAMINE SULFATE AND AMPHETAMINE SULFATE 2.5; 2.5; 2.5; 2.5 MG/1; MG/1; MG/1; MG/1
10 TABLET ORAL DAILY
Qty: 30 TABLET | Refills: 0 | Status: SHIPPED | OUTPATIENT
Start: 2020-10-15 | End: 2020-11-19 | Stop reason: SDUPTHER

## 2020-10-15 NOTE — TELEPHONE ENCOUNTER
Scheduled pt 10/30/2020 at 11:30/ Advised pt he needs to make appointment due to it being a new provider.

## 2020-11-13 DIAGNOSIS — F90.9 ADULT ADHD: ICD-10-CM

## 2020-11-13 RX ORDER — DEXTROAMPHETAMINE SACCHARATE, AMPHETAMINE ASPARTATE, DEXTROAMPHETAMINE SULFATE AND AMPHETAMINE SULFATE 2.5; 2.5; 2.5; 2.5 MG/1; MG/1; MG/1; MG/1
10 TABLET ORAL DAILY
Qty: 30 TABLET | Refills: 0 | OUTPATIENT
Start: 2020-11-13

## 2020-11-13 NOTE — TELEPHONE ENCOUNTER
Patient requested refill in mid-October and was advised at that time he needed to be seen first. He scheduled an appointment but was a no-show to that appointment. I must see him before I prescribe him his medications.

## 2020-11-19 ENCOUNTER — TELEMEDICINE (OUTPATIENT)
Dept: PSYCHIATRY | Facility: CLINIC | Age: 25
End: 2020-11-19

## 2020-11-19 DIAGNOSIS — F90.9 ADULT ADHD: Primary | ICD-10-CM

## 2020-11-19 PROCEDURE — 90792 PSYCH DIAG EVAL W/MED SRVCS: CPT | Performed by: NURSE PRACTITIONER

## 2020-11-19 RX ORDER — DEXTROAMPHETAMINE SACCHARATE, AMPHETAMINE ASPARTATE, DEXTROAMPHETAMINE SULFATE AND AMPHETAMINE SULFATE 2.5; 2.5; 2.5; 2.5 MG/1; MG/1; MG/1; MG/1
10 TABLET ORAL DAILY
Qty: 30 TABLET | Refills: 0 | Status: SHIPPED | OUTPATIENT
Start: 2020-11-19 | End: 2020-12-21 | Stop reason: SDUPTHER

## 2020-11-19 NOTE — PROGRESS NOTES
This provider is located at The [facility], Behavioral Health ,[address], [address] in [place], [place],using a secure WinViewhart Video Visit through [employer]. Patient is being seen remotely via telehealth at their home address in [place], and stated they are in a secure environment for this session. The patient's condition being diagnosed/treated is appropriate for telemedicine. The provider identified herself as well as her credentials.   The patient, and/or patients guardian, consent to be seen remotely, and when consent is given they understand that the consent allows for patient identifiable information to be sent to a third party as needed.   They may refuse to be seen remotely at any time. The electronic data is encrypted and password protected, and the patient and/or guardian has been advised of the potential risks to privacy not withstanding such measures.    Radha Singer is a 24 y.o. male who presents today for follow up    Chief Complaint: ADHD    History of Present Illness: This is a 24-year-old  male with a past psychiatric history of attention deficit hyperactivity disorder with primarily inattentive presentation who presents today via MyChart Video through [employer]  for medication management.  Patient presents today says that he is doing very well.  He is currently taking Adderall IR 10 mg once a day, and reports that this lasts him throughout the entire day, without needing to take any midday or afternoon booster dose.  He is currently finishing up a dual degree of [employer] science, and a BBA in market research at [employer].  He lives here in [place], [place].  His goal is to pursue law school after finishing these 2 degrees.  He endorses sleeping 7 to 9 hours per night, and wakes feeling rested.  He endorses a good appetite and denies any weight loss.  He denies any anxiety, increased stress, denies SI/HI, A/V hallucinations.  Reports  "he is currently quarantine eating with his girlfriend, his roommate, and his roommate's girlfriend.  Reports they all went to a wedding this last Saturday, November 14, and there was approximately 50 people at the wedding.  He reports multiple people from the wedding reception have now tested positive for COVID-19, including the 4 of them.  He reports his symptoms so far have been \"mild, and manageable\".  He expresses regret at taking the risk and going to the wedding.      The following portions of the patient's history were reviewed and updated as appropriate: allergies, current medications, past family history, past medical history, past social history, past surgical history and problem list.      Past Medical History:  Past Medical History:   Diagnosis Date   • ADHD (attention deficit hyperactivity disorder)        Social History:  Social History     Socioeconomic History   • Marital status: Single     Spouse name: Not on file   • Number of children: Not on file   • Years of education: Not on file   • Highest education level: Not on file   Tobacco Use   • Smoking status: Current Every Day Smoker     Types: Electronic Cigarette   • Smokeless tobacco: Never Used   Substance and Sexual Activity   • Alcohol use: Yes   • Drug use: No       Family History:  Family History   Problem Relation Age of Onset   • ADD / ADHD Neg Hx    • Alcohol abuse Neg Hx    • Anxiety disorder Neg Hx    • Bipolar disorder Neg Hx    • Dementia Neg Hx    • Depression Neg Hx    • Drug abuse Neg Hx    • OCD Neg Hx    • Paranoid behavior Neg Hx    • Schizophrenia Neg Hx    • Seizures Neg Hx    • Self-Injurious Behavior  Neg Hx    • Suicide Attempts Neg Hx        Past Surgical History:  History reviewed. No pertinent surgical history.    Problem List:  There is no problem list on file for this patient.       Allergy:   Allergies   Allergen Reactions   • Azithromycin Hives        Current Medications:   Current Outpatient Medications   Medication " Sig Dispense Refill   • amphetamine-dextroamphetamine (Adderall) 10 MG tablet Take 1 tablet by mouth Daily. 30 tablet 0     No current facility-administered medications for this visit.        Review of Symptoms:    Review of Systems   Constitutional: Negative for diaphoresis.   Eyes: Negative for visual disturbance.   Respiratory: Negative for chest tightness.    Cardiovascular: Negative for chest pain.   Gastrointestinal: Negative for abdominal pain.   Endocrine: Negative for polyuria.   Genitourinary: Negative for difficulty urinating.   Musculoskeletal: Negative for gait problem.   Skin: Negative for color change.   Allergic/Immunologic: Negative for immunocompromised state.   Neurological: Negative for seizures.   Hematological: Does not bruise/bleed easily.   Psychiatric/Behavioral: Positive for decreased concentration. Negative for agitation, self-injury, sleep disturbance, suicidal ideas, depressed mood and stress. The patient is not nervous/anxious.          Physical Exam:   There were no vitals taken for this visit.  There is no height or weight on file to calculate BMI.    Appearance:  male who appears stated age  Gait, Station, Strength: Video visit, unable to determine    Mental Status Exam:   Hygiene:   Video visit, unable to determine  Cooperation:  Cooperative  Eye Contact:  Good  Psychomotor Behavior:  Appropriate  Affect:  Appropriate  Mood: normal  Hopelessness: Denies  Speech:  Normal  Thought Process:  Goal directed  Thought Content:  Normal  Suicidal:  None  Homicidal:  None  Hallucinations:  None  Delusion:  None  Memory:  Intact  Orientation:  Person, Place, Time and Situation  Reliability:  good  Insight:  Good  Judgement:  Good  Impulse Control:  Good  Physical/Medical Issues:  No      PHQ-Score Total:  PHQ-9 Total Score:        Lab Results:   No visits with results within 1 Month(s) from this visit.   Latest known visit with results is:   Admission on 04/23/2019, Discharged on  04/23/2019   Component Date Value Ref Range Status   • Color, UA 04/23/2019 Yellow  Yellow, Straw Final   • Appearance, UA 04/23/2019 Clear  Clear Final   • pH, UA 04/23/2019 7.0  5.0 - 8.0 Final   • Specific Gravity, UA 04/23/2019 <=1.005  1.005 - 1.030 Final   • Glucose, UA 04/23/2019 Negative  Negative Final   • Ketones, UA 04/23/2019 Negative  Negative Final   • Bilirubin, UA 04/23/2019 Negative  Negative Final   • Blood, UA 04/23/2019 Negative  Negative Final   • Protein, UA 04/23/2019 Negative  Negative Final   • Leuk Esterase, UA 04/23/2019 Negative  Negative Final   • Nitrite, UA 04/23/2019 Negative  Negative Final   • Urobilinogen, UA 04/23/2019 0.2 E.U./dL  0.2 - 1.0 E.U./dL Final   • Chlamydia trachomatis, ANGELICA 04/23/2019 Negative  Negative Final   • Neisseria gonorrhoeae, ANGELICA 04/23/2019 Negative  Negative Final       Assessment/Plan   Diagnoses and all orders for this visit:    1. Adult ADHD (Primary)  -     amphetamine-dextroamphetamine (Adderall) 10 MG tablet; Take 1 tablet by mouth Daily.  Dispense: 30 tablet; Refill: 0    -Patient is satisfied with current medication regimen of Adderall IR 10 mg daily.  -Sent refill of medication to patient's pharmacy.  -Set follow-up for 3 months    Visit Diagnoses:    ICD-10-CM ICD-9-CM   1. Adult ADHD  F90.9 314.01       TREATMENT PLAN/GOALS: Continue supportive psychotherapy efforts and medications as indicated. Treatment and medication options discussed during today's visit. Patient acknowledged and verbally consented to continue with current treatment plan and was educated on the importance of compliance with treatment and follow-up appointments.    MEDICATION ISSUES:    Discussed medication options and treatment plan of prescribed medication as well as the risks, benefits, and side effects including potential falls, possible impaired driving and metabolic adversities among others. Patient is agreeable to call the office with any worsening of symptoms or onset  of side effects. Patient is agreeable to call 911 or go to the nearest ER should he/she begin having SI/HI.     MEDS ORDERED DURING VISIT:  New Medications Ordered This Visit   Medications   • amphetamine-dextroamphetamine (Adderall) 10 MG tablet     Sig: Take 1 tablet by mouth Daily.     Dispense:  30 tablet     Refill:  0       Return in about 3 months (around 2/19/2021) for Next scheduled follow up.             This document has been electronically signed by CAROL Cherry  November 20, 2020 08:13 EST    Part of this note may be an electronic transcription/translation of spoken language to printed text using the Dragon Dictation System.

## 2020-12-21 DIAGNOSIS — F90.9 ADULT ADHD: ICD-10-CM

## 2020-12-21 RX ORDER — DEXTROAMPHETAMINE SACCHARATE, AMPHETAMINE ASPARTATE, DEXTROAMPHETAMINE SULFATE AND AMPHETAMINE SULFATE 2.5; 2.5; 2.5; 2.5 MG/1; MG/1; MG/1; MG/1
10 TABLET ORAL DAILY
Qty: 30 TABLET | Refills: 0 | Status: SHIPPED | OUTPATIENT
Start: 2020-12-21 | End: 2021-01-25 | Stop reason: SDUPTHER

## 2021-01-25 DIAGNOSIS — F90.9 ADULT ADHD: ICD-10-CM

## 2021-01-25 RX ORDER — DEXTROAMPHETAMINE SACCHARATE, AMPHETAMINE ASPARTATE, DEXTROAMPHETAMINE SULFATE AND AMPHETAMINE SULFATE 2.5; 2.5; 2.5; 2.5 MG/1; MG/1; MG/1; MG/1
10 TABLET ORAL DAILY
Qty: 30 TABLET | Refills: 0 | Status: SHIPPED | OUTPATIENT
Start: 2021-01-25 | End: 2021-02-25 | Stop reason: SDUPTHER

## 2021-02-25 ENCOUNTER — TELEMEDICINE (OUTPATIENT)
Dept: PSYCHIATRY | Facility: CLINIC | Age: 26
End: 2021-02-25

## 2021-02-25 DIAGNOSIS — F90.9 ADULT ADHD: ICD-10-CM

## 2021-02-25 DIAGNOSIS — F90.0 ADHD (ATTENTION DEFICIT HYPERACTIVITY DISORDER), INATTENTIVE TYPE: Primary | ICD-10-CM

## 2021-02-25 PROCEDURE — 99213 OFFICE O/P EST LOW 20 MIN: CPT | Performed by: NURSE PRACTITIONER

## 2021-02-25 RX ORDER — DEXTROAMPHETAMINE SACCHARATE, AMPHETAMINE ASPARTATE, DEXTROAMPHETAMINE SULFATE AND AMPHETAMINE SULFATE 2.5; 2.5; 2.5; 2.5 MG/1; MG/1; MG/1; MG/1
10 TABLET ORAL DAILY
Qty: 30 TABLET | Refills: 0 | Status: SHIPPED | OUTPATIENT
Start: 2021-02-25 | End: 2021-03-25 | Stop reason: SDUPTHER

## 2021-02-25 NOTE — PROGRESS NOTES
"This provider is located at The [facility], Behavioral Health ,[address], [address] in [place], [place],using a secure MyChart Video Visit through [employer]. Patient is being seen remotely via telehealth at their home address in [place], and stated they are in a secure environment for this session. The patient's condition being diagnosed/treated is appropriate for telemedicine. The provider identified herself as well as her credentials.   The patient, and/or patients guardian, consent to be seen remotely, and when consent is given they understand that the consent allows for patient identifiable information to be sent to a third party as needed.   They may refuse to be seen remotely at any time. The electronic data is encrypted and password protected, and the patient and/or guardian has been advised of the potential risks to privacy not withstanding such measures.    Chief Complaint  ADHD      Subjective          Yobani Singer presents today via MyChart Video through [employer] for medication management of his ADHD.    History of Present Illness: Patient presents today for follow-up after last being seen on 11/20/2020.  At previous visit, patient reported he was doing very well on his regimen of Adderall IR 10 mg once daily.  He reports that that dose lasts him throughout the day, without needing a second midday or afternoon dose.  Patient presents today's appointment reports he is still doing very well on the Adderall IR 10 mg daily.  He reports it still controls his ADHD, \"for what I need to do\".  He reports he is not taking it on the weekends and on days \"I do not really have to focus as hard\".  Patient reports he is recently traveled home to [place], [place] because his classes now are generally every other week.  This means he usually has somewhere between 7 and 10 days with no school.  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  He says he will likely go to [employer], because they offered him a half off tuition scholarship, and unless [employer] gives him more than that, he will likely go to [employer].  Patient denies any issues with appetite.  He does report he has noticed he is struggling some with sleep recently, but reports that is nothing he cannot handle.  Patient denies any SI/HI, A/V hallucinations.    Current Medications:   Current Outpatient Medications   Medication Sig Dispense Refill   • amphetamine-dextroamphetamine (Adderall) 10 MG tablet Take 1 tablet by mouth Daily. 30 tablet 0     No current facility-administered medications for this visit.        Mental Status Exam:   Hygiene:   MyChart video visit, unable to determine.  Cooperation:  Cooperative  Eye Contact:  Good  Psychomotor Behavior:  Appropriate  Affect:  Full range  Mood: euthymic  Speech:  Normal  Thought Process:  Goal directed  Thought Content:  Mood congruent  Suicidal:  None  Homicidal:  None  Hallucinations:  None  Delusion:  None  Memory:  Intact  Orientation:  Person, Place, Time and Situation  Reliability:  good  Insight:  Good  Judgement:  Good  Impulse Control:  Good  Physical/Medical Issues:  No      Objective   Vital Signs:   There were no vitals taken for this visit.    Physical Exam  Neurological:      Mental Status: He is oriented to person, place, and time. Mental status is at baseline.   Psychiatric:         Attention and Perception: Attention and perception normal.         Mood and Affect: Mood and affect normal.         Speech: Speech normal.         Behavior: Behavior is cooperative.         Thought Content: Thought content normal.         Cognition and Memory: Cognition and memory normal.         Judgment: Judgment normal.        Result Review :     The following data was reviewed by: CAROL Cherry on 02/25/2021:    Data reviewed: Previous note, and medication history.          Assessment and Plan    Problem List Items Addressed This Visit        None      Visit Diagnoses     ADHD (attention deficit hyperactivity disorder), inattentive type    -  Primary    Relevant Medications    amphetamine-dextroamphetamine (Adderall) 10 MG tablet    Adult ADHD        Relevant Medications    amphetamine-dextroamphetamine (Adderall) 10 MG tablet          PHQ-9 Score:   PHQ-9 Total Score: 0    Depression Screening:  Patient screened positive for depression based on a PHQ-9 score of  on . Follow-up recommendations include: Suicide Risk Assessment performed.       Tobacco Cessation:  Yobani Singer  reports that he has been smoking electronic cigarette. He has never used smokeless tobacco.. I have educated him on the risk of diseases from using tobacco products such as cancer, COPD and heart disease.     I advised him to quit and he is not willing to quit.    I spent 3  minutes counseling the patient.    Impression/Plan:  -This is my first follow-up point with patient.  Patient presents today and reports he is doing very well on his current regimen of Adderall IR 10 mg daily.  He denies any adverse effects associated with medications, and reports he continues to control his ADHD well.  He reports the medication often lasts through the late afternoon and into early evening.  Patient denies the presence of any new anxiety or mood issues.  -Maintain Adderall IR 10 mg daily.  Refill sent to [facility] pharmacy.  -Schedule follow-up for 3 months or as needed.    MEDS ORDERED DURING VISIT:  New Medications Ordered This Visit   Medications   • amphetamine-dextroamphetamine (Adderall) 10 MG tablet     Sig: Take 1 tablet by mouth Daily.     Dispense:  30 tablet     Refill:  0       I spent 22 minutes caring for Yobani on this date of service. This time includes time spent by me in the following activities:preparing for the visit, performing a medically appropriate examination and/or evaluation , counseling and educating the patient/family/caregiver, ordering medications, tests, or  procedures and documenting information in the medical record  Follow Up   Return in about 3 months (around 5/25/2021), or if symptoms worsen or fail to improve, for Next scheduled follow up.  Patient was given instructions and counseling regarding his condition or for health maintenance advice. Please see specific information pulled into the AVS if appropriate.       TREATMENT PLAN/GOALS: Continue supportive psychotherapy efforts and medications as indicated. Treatment and medication options discussed during today's visit. Patient acknowledged and verbally consented to continue with current treatment plan and was educated on the importance of compliance with treatment and follow-up appointments.    MEDICATION ISSUES:  Discussed medication options and treatment plan of prescribed medication as well as the risks, benefits, and side effects including potential falls, possible impaired driving and metabolic adversities among others. Patient is agreeable to call the office with any worsening of symptoms or onset of side effects. Patient is agreeable to call 911 or go to the nearest ER should he/she begin having SI/HI.          This document has been electronically signed by CAROL Joshua, PMHNP-BC  February 25, 2021 14:18 EST      Part of this note may be an electronic transcription/translation of spoken language to printed text using the Dragon Dictation System.

## 2021-03-25 DIAGNOSIS — F90.9 ADULT ADHD: ICD-10-CM

## 2021-03-25 RX ORDER — DEXTROAMPHETAMINE SACCHARATE, AMPHETAMINE ASPARTATE, DEXTROAMPHETAMINE SULFATE AND AMPHETAMINE SULFATE 2.5; 2.5; 2.5; 2.5 MG/1; MG/1; MG/1; MG/1
10 TABLET ORAL DAILY
Qty: 30 TABLET | Refills: 0 | Status: SHIPPED | OUTPATIENT
Start: 2021-03-25 | End: 2021-05-10 | Stop reason: SDUPTHER

## 2021-05-10 DIAGNOSIS — F90.9 ADULT ADHD: ICD-10-CM

## 2021-05-10 RX ORDER — DEXTROAMPHETAMINE SACCHARATE, AMPHETAMINE ASPARTATE, DEXTROAMPHETAMINE SULFATE AND AMPHETAMINE SULFATE 2.5; 2.5; 2.5; 2.5 MG/1; MG/1; MG/1; MG/1
10 TABLET ORAL DAILY
Qty: 30 TABLET | Refills: 0 | Status: SHIPPED | OUTPATIENT
Start: 2021-05-10 | End: 2021-06-28 | Stop reason: SDUPTHER

## 2021-05-20 ENCOUNTER — TELEMEDICINE (OUTPATIENT)
Dept: PSYCHIATRY | Facility: CLINIC | Age: 26
End: 2021-05-20

## 2021-05-20 DIAGNOSIS — F90.0 ADHD (ATTENTION DEFICIT HYPERACTIVITY DISORDER), INATTENTIVE TYPE: Primary | ICD-10-CM

## 2021-05-20 PROCEDURE — 99213 OFFICE O/P EST LOW 20 MIN: CPT | Performed by: NURSE PRACTITIONER

## 2021-05-20 NOTE — PROGRESS NOTES
This provider is located at The [facility], Behavioral Health ,[address], [address] in [place], [place],using a secure SpiderOakhart Video Visit through [employer]. Patient is being seen remotely via telehealth at their home address in [place], and stated they are in a secure environment for this session. The patient's condition being diagnosed/treated is appropriate for telemedicine. The provider identified herself as well as her credentials.   The patient, and/or patients guardian, consent to be seen remotely, and when consent is given they understand that the consent allows for patient identifiable information to be sent to a third party as needed.   They may refuse to be seen remotely at any time. The electronic data is encrypted and password protected, and the patient and/or guardian has been advised of the potential risks to privacy not withstanding such measures.    Chief Complaint  ADHD      Subjective          Yobani Singer presents today via Spider[employer]art Video through [employer] for medication management of his ADHD.    History of Present Illness: She presents today via [employer] video for a follow-up after last being seen on 02/25/2021.  At last appointment, the patient was doing well and was maintained on his medication at the current dose.  Patient presents today and reports he continues to do well.  He finished his undergrad this past semester, and decided to attend  school of law over [employer].  Patient reports the type of law he is wanting to go into,  has a slightly better program than [place].  He is excited to get started this fall, however admits he is also somewhat nervous because he knows this school will be much more difficult then [employer] was.  Patient plans to live at home in [place], [place] for the summer.  He is hoping to find some kind of part-time job possibly something he can do from home and try to relax over the summer before school starts back up in  August.  Patient denies any issues with his medications.  He reports his ADHD continues to be well controlled on the immediate release 10 mg once a day.  Patient denies any issues with sleep or appetite.  Patient denies any SI/HI, A/V hallucinations.    Current Medications:   Current Outpatient Medications   Medication Sig Dispense Refill   • amphetamine-dextroamphetamine (Adderall) 10 MG tablet Take 1 tablet by mouth Daily. 30 tablet 0     No current facility-administered medications for this visit.       Mental Status Exam:   Hygiene:   MyChart video visit, unable to determine.  Cooperation:  Cooperative  Eye Contact:  Good  Psychomotor Behavior:  Appropriate  Affect:  Appropriate  Mood: normal  Speech:  Normal  Thought Process:  Goal directed  Thought Content:  Mood congruent  Suicidal:  None  Homicidal:  None  Hallucinations:  None  Delusion:  None  Memory:  Intact  Orientation:  Person, Place, Time and Situation  Reliability:  good  Insight:  Good  Judgement:  Good  Impulse Control:  Good  Physical/Medical Issues:  No      Objective   Vital Signs:   There were no vitals taken for this visit.    Physical Exam  Neurological:      Mental Status: He is oriented to person, place, and time. Mental status is at baseline.   Psychiatric:         Behavior: Behavior is cooperative.         Thought Content: Thought content normal.         Cognition and Memory: Cognition and memory normal.         Judgment: Judgment normal.        Result Review :     The following data was reviewed by: CAROL Cherry on 05/20/2021:    Data reviewed: Previous notes, and medication history.          Assessment and Plan    Problem List Items Addressed This Visit     None      Visit Diagnoses     ADHD (attention deficit hyperactivity disorder), inattentive type    -  Primary          PHQ-9 Score:   PHQ-9 Total Score: 0    Depression Screening:  Patient screened positive for depression based on a PHQ-9 score of 0 on 5/20/2021. Follow-up  recommendations include: Suicide Risk Assessment performed.       Tobacco Cessation:  Yobani Singer  reports that he has been smoking electronic cigarette. He has never used smokeless tobacco.. I have educated him on the risk of diseases from using tobacco products such as cancer, COPD and heart disease.     I advised him to quit and he is not willing to quit.    I spent 3  minutes counseling the patient.      Impression/Plan:  -This is a follow-up appointment.  Patient presents today via [employer] video and reports he continues to do well on his current medication regimen of Adderall IR 10 mg daily.  Patient endorses good symptom control, and denies any adverse effects associated with it.  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  -Maintain Adderall IR 10 mg daily.  Recently refilled.  -Schedule follow-up for 12 weeks, to reevaluate prior to the fall semester starting.    MEDS ORDERED DURING VISIT:  No orders of the defined types were placed in this encounter.        Follow Up   Return in about 12 weeks (around 8/12/2021), or if symptoms worsen or fail to improve, for Next scheduled follow up, Video visit.  Patient was given instructions and counseling regarding his condition or for health maintenance advice. Please see specific information pulled into the AVS if appropriate.       TREATMENT PLAN/GOALS: Continue supportive psychotherapy efforts and medications as indicated. Treatment and medication options discussed during today's visit. Patient acknowledged and verbally consented to continue with current treatment plan and was educated on the importance of compliance with treatment and follow-up appointments.    MEDICATION ISSUES:  Discussed medication options and treatment plan of prescribed medication as well as the risks, benefits, and side effects including potential  falls, possible impaired driving and metabolic adversities among others. Patient is agreeable to call the office with any worsening of symptoms or onset of side effects. Patient is agreeable to call 911 or go to the nearest ER should he/she begin having SI/HI.          This document has been electronically signed by CAROL Joshua, PMHNP-BC  May 20, 2021 14:44 EDT      Part of this note may be an electronic transcription/translation of spoken language to printed text using the Dragon Dictation System.

## 2021-06-28 DIAGNOSIS — F90.9 ADULT ADHD: ICD-10-CM

## 2021-06-28 RX ORDER — DEXTROAMPHETAMINE SACCHARATE, AMPHETAMINE ASPARTATE, DEXTROAMPHETAMINE SULFATE AND AMPHETAMINE SULFATE 2.5; 2.5; 2.5; 2.5 MG/1; MG/1; MG/1; MG/1
10 TABLET ORAL DAILY
Qty: 30 TABLET | Refills: 0 | Status: SHIPPED | OUTPATIENT
Start: 2021-06-28 | End: 2021-08-03 | Stop reason: SDUPTHER

## 2021-06-28 NOTE — TELEPHONE ENCOUNTER
RX REQUEST. TRIED TO CALL PATIENT BACK TO [facility] WE NEED A UDS AND TO SCHEDULE A F/U APPOINTMENT SCHEDULED IN AUGUST

## 2021-08-03 ENCOUNTER — OFFICE VISIT (OUTPATIENT)
Dept: PSYCHIATRY | Facility: CLINIC | Age: 26
End: 2021-08-03

## 2021-08-03 VITALS
DIASTOLIC BLOOD PRESSURE: 78 MMHG | HEART RATE: 82 BPM | HEIGHT: 70 IN | WEIGHT: 159 LBS | BODY MASS INDEX: 22.76 KG/M2 | SYSTOLIC BLOOD PRESSURE: 124 MMHG

## 2021-08-03 DIAGNOSIS — F90.0 ADHD (ATTENTION DEFICIT HYPERACTIVITY DISORDER), INATTENTIVE TYPE: Primary | Chronic | ICD-10-CM

## 2021-08-03 DIAGNOSIS — Z79.899 MEDICATION MANAGEMENT: ICD-10-CM

## 2021-08-03 PROCEDURE — 99213 OFFICE O/P EST LOW 20 MIN: CPT | Performed by: NURSE PRACTITIONER

## 2021-08-03 RX ORDER — DEXTROAMPHETAMINE SACCHARATE, AMPHETAMINE ASPARTATE, DEXTROAMPHETAMINE SULFATE AND AMPHETAMINE SULFATE 2.5; 2.5; 2.5; 2.5 MG/1; MG/1; MG/1; MG/1
10 TABLET ORAL DAILY
Qty: 30 TABLET | Refills: 0 | Status: SHIPPED | OUTPATIENT
Start: 2021-08-03 | End: 2021-09-07 | Stop reason: SDUPTHER

## 2021-08-03 NOTE — PROGRESS NOTES
Chief Complaint  ADHD    Subjective          Yobani Singer presents to [facility] for medication management of his ADHD.    History of Present Illness: Patient presents today for follow-up appointment after last being seen on 05/20/2021.  Patient reports he feels as though he has been doing well since his last appointment.  He says he is been trying to enjoy the summer as much as he can, but has been and see to get started with school this fall.  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  Patient tried to find a roommate to live with, however was unable to find anyone and had to get a studio apartment in [place].  Patient reports there was nothing else available, and that this was not sheet.  Patient reports his ADHD remains well controlled on the 10 mg once a day.  Patient expresses some concern he may have to increase that down the road, but is so far unsure what his workload is going to look like when school starts.  Patient denies any issues with sleeping or appetite.  Patient denies any SI/HI, A/V hallucinations.    Current Medications:   Current Outpatient Medications   Medication Sig Dispense Refill   • amphetamine-dextroamphetamine (Adderall) 10 MG tablet Take 1 tablet by mouth Daily. 30 tablet 0     No current facility-administered medications for this visit.       Mental Status Exam:   Hygiene:   good  Cooperation:  Cooperative  Eye Contact:  Good  Psychomotor Behavior:  Appropriate  Affect:  Appropriate  Mood: normal and euthymic  Speech:  Normal  Thought Process:  Goal directed  Thought Content:  Mood congruent  Suicidal:  None  Homicidal:  None  Hallucinations:  None  Delusion:  None  Memory:  Intact  Orientation:  Person, Place, Time and Situation  Reliability:  good  Insight:  Good  Judgement:  Good  Impulse Control:  Good  Physical/Medical Issues:  No        Objective   Vital Signs:   /78   Pulse 82   Ht 177.8 cm  "(70\")   Wt 72.1 kg (159 lb)   BMI 22.81 kg/m²     Physical Exam  Neurological:      Mental Status: He is oriented to person, place, and time. Mental status is at baseline.      Coordination: Coordination is intact.      Gait: Gait is intact.   Psychiatric:         Behavior: Behavior is cooperative.         Thought Content: Thought content normal.         Cognition and Memory: Cognition and memory normal.         Judgment: Judgment normal.        Result Review :     The following data was reviewed by: CAROL Cherry on 08/03/2021:    Data reviewed: Previous note, medication history          Assessment and Plan    Problem List Items Addressed This Visit     None      Visit Diagnoses     ADHD (attention deficit hyperactivity disorder), inattentive type  (Chronic)   -  Primary    Relevant Medications    amphetamine-dextroamphetamine (Adderall) 10 MG tablet    Medication management        Relevant Orders    Compliance Drug Analysis, Ur - Urine, Clean Catch          PHQ-9 Score:   PHQ-9 Total Score: 0    Depression Screening:  Patient screened positive for depression based on a PHQ-9 score of 0 on 8/3/2021. Follow-up recommendations include: Suicide Risk Assessment performed.      Tobacco Cessation:  Patient is a former smoker. No tobacco cessation education necessary.         Impression/Plan:  -This is a follow-up appointment.  Patient presents today reports he has been doing well since his last appointment.  His ADHD symptom burden continues to be well relieved on his current medication dose, and the patient is happy where he is.  He denies any adverse effects associated with medication, and endorses taking as prescribed.  -Maintain Adderall IR 10 mg daily.  Refill sent.  -UDS completed in office today.  -The patient is being prescribed a controlled substance as part of the treatment plan. The patient/guardian has been educated of appropriate use of the medication(s), including risks and side effects such as " insomnia, headache, exacerbation of tics, nervousness, irritability, overstimulation, tremor, dizziness, anorexia or change in appetite, nausea, dry mouth, constipation, diarrhea, weight loss, sexual dysfunction, psychotic episodes, seizures, palpitations, tachycardia, hypertension, rare activation (activation of hypomania, toni, and/or suicidal ideations), cardiovascular adverse effects including sudden death (especially patients with pre-existing structural abnormalities often associated with a family history of cardiac disease), may slow normal growth in children, weight gain is reported but not expected, sedation is possible but activation is much more common, metabolic adversities, and overdose among others. Patient/guardian is also informed that the medication is to be used by the patient only, the medication is to be used only as directed, and the medication should not be combined with other substances unless directed by a Provider/Prescriber. The patient/guardian verbalized understanding and agreement with this in their own words, and wish to continue with current treatment plan.  Controlled substance agreement completed and filed in the patient's chart.  -Patient's ROWENA report reviewed and deemed appropriate.  The patient/guardian endorses medication is taken as prescribed, and denies any abuse or misuse of the medication.  The patient/guardian denies any other substance use or issues.  There are no apparent substance related issues.  The patient reports no adverse effects of the current medication usage and is appropriate to continue with current medication usage at this time.  Scheduled medications can be prescribed by one provider at a time and dispensed from one facility at a time.  They can only be taken as prescribed, and we are not obligated to refill them if lost or stolen.  Reinforced risks and side effects of medication usage, patient and/or guardian verbalize understanding in their own words  and are in agreement with current plan.  -Schedule follow-up for 3 months or as needed.    MEDS ORDERED DURING VISIT:  New Medications Ordered This Visit   Medications   • amphetamine-dextroamphetamine (Adderall) 10 MG tablet     Sig: Take 1 tablet by mouth Daily.     Dispense:  30 tablet     Refill:  0         Follow Up   Return in about 3 months (around 11/3/2021), or if symptoms worsen or fail to improve, for Next scheduled follow up.  Patient was given instructions and counseling regarding his condition or for health maintenance advice. Please see specific information pulled into the AVS if appropriate.       TREATMENT PLAN/GOALS: Continue supportive psychotherapy efforts and medications as indicated. Treatment and medication options discussed during today's visit. Patient acknowledged and verbally consented to continue with current treatment plan and was educated on the importance of compliance with treatment and follow-up appointments.    MEDICATION ISSUES:  Discussed medication options and treatment plan of prescribed medication as well as the risks, benefits, and side effects including potential falls, possible impaired driving and metabolic adversities among others. Patient is agreeable to call the office with any worsening of symptoms or onset of side effects. Patient is agreeable to call 911 or go to the nearest ER should he/she begin having SI/HI.          This document has been electronically signed by CAROL Joshua, PMHNP-BC  August 4, 2021 07:46 EDT      Part of this note may be an electronic transcription/translation of spoken language to printed text using the Dragon Dictation System.

## 2021-08-07 LAB — DRUGS UR: NORMAL

## 2021-09-07 DIAGNOSIS — F90.0 ADHD (ATTENTION DEFICIT HYPERACTIVITY DISORDER), INATTENTIVE TYPE: Chronic | ICD-10-CM

## 2021-09-07 RX ORDER — DEXTROAMPHETAMINE SACCHARATE, AMPHETAMINE ASPARTATE, DEXTROAMPHETAMINE SULFATE AND AMPHETAMINE SULFATE 2.5; 2.5; 2.5; 2.5 MG/1; MG/1; MG/1; MG/1
10 TABLET ORAL DAILY
Qty: 30 TABLET | Refills: 0 | Status: SHIPPED | OUTPATIENT
Start: 2021-09-07 | End: 2021-09-28 | Stop reason: SDUPTHER

## 2021-09-07 NOTE — TELEPHONE ENCOUNTER
Incoming Refill Request      Medication requested (name and dose): adderall 10 mg tablet    Pharmacy where request should be sent: [facility]    Additional details provided by patient: N/A     Best call back number: [phone]    Does the patient have less than a 3 day supply:    Rx Refill Note  Requested Prescriptions     Pending Prescriptions Disp Refills   • amphetamine-dextroamphetamine (Adderall) 10 MG tablet 30 tablet 0     Sig: Take 1 tablet by mouth Daily.      Last office visit with prescribing clinician: 8/3/2021      Next office visit with prescribing clinician: 11/2/2021            Breanna Morrison CMA  09/07/21, 09:58 EDT[x] Yes  [] No    Breanna Morrison CMA  09/07/21, 09:57 EDT

## 2021-09-21 ENCOUNTER — TELEPHONE (OUTPATIENT)
Dept: PSYCHIATRY | Facility: CLINIC | Age: 26
End: 2021-09-21

## 2021-09-21 NOTE — TELEPHONE ENCOUNTER
Pt states that he would like to increase adderall to 10 mg BID, if that's possible or does he need to schedule a sooner appointment for this change.

## 2021-09-28 ENCOUNTER — TELEMEDICINE (OUTPATIENT)
Dept: PSYCHIATRY | Facility: CLINIC | Age: 26
End: 2021-09-28

## 2021-09-28 DIAGNOSIS — F90.0 ADHD (ATTENTION DEFICIT HYPERACTIVITY DISORDER), INATTENTIVE TYPE: Primary | Chronic | ICD-10-CM

## 2021-09-28 PROCEDURE — 99214 OFFICE O/P EST MOD 30 MIN: CPT | Performed by: NURSE PRACTITIONER

## 2021-09-28 RX ORDER — DEXTROAMPHETAMINE SACCHARATE, AMPHETAMINE ASPARTATE, DEXTROAMPHETAMINE SULFATE AND AMPHETAMINE SULFATE 2.5; 2.5; 2.5; 2.5 MG/1; MG/1; MG/1; MG/1
10 TABLET ORAL 2 TIMES DAILY
Qty: 60 TABLET | Refills: 0 | Status: SHIPPED | OUTPATIENT
Start: 2021-09-28 | End: 2021-10-28 | Stop reason: SDUPTHER

## 2021-09-28 NOTE — PROGRESS NOTES
"This provider is located at [facility], [address] in [place], [place],using a secure MyChart Video Visit through [employer]. Patient is being seen remotely via telehealth at their home address in [place], and stated they are in a secure environment for this session. The patient's condition being diagnosed/treated is appropriate for telemedicine. The provider identified herself as well as her credentials.   The patient, and/or patients guardian, consent to be seen remotely, and when consent is given they understand that the consent allows for patient identifiable information to be sent to a third party as needed.   They may refuse to be seen remotely at any time. The electronic data is encrypted and password protected, and the patient and/or guardian has been advised of the potential risks to privacy not withstanding such measures.    Chief Complaint  ADHD      Subjective          Yobani Singer presents today via MyChart Video through [employer] for medication management of his ADHD.    History of Present Illness: Patient presents today for an earlier than scheduled follow-up appointment after last being seen on 08/03/2021.  Patient reports today \"I am doing pretty good, but I am struggling in school\".  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  \"If I take my Adderall in the morning, I get through class fine, but then I cannot study and read in the evening\".  Patient reports he is having to do a lot more work outside of class than he did in his undergrad, and is finding it increasingly difficult \"because I read, but because my medication is worn off I cannot retain any of it\". Patient denies any new or significant issues with sleep or appetite. Patient denies any SI/HI, A/V hallucinations.    Current Medications:   Current Outpatient Medications   Medication " Sig Dispense Refill   • amphetamine-dextroamphetamine (Adderall) 10 MG tablet Take 1 tablet by mouth Daily. 30 tablet 0     No current facility-administered medications for this visit.       Mental Status Exam:   Hygiene:   MyChart video  Cooperation:  Cooperative  Eye Contact:  Good  Psychomotor Behavior:  Appropriate  Affect:  Appropriate  Mood: normal and euthymic  Speech:  Normal  Thought Process:  Goal directed  Thought Content:  Mood congruent  Suicidal:  None  Homicidal:  None  Hallucinations:  None  Delusion:  None  Memory:  Intact  Orientation:  Person, Place, Time and Situation  Reliability:  good  Insight:  Good  Judgement:  Good  Impulse Control:  Good  Physical/Medical Issues:  No      Objective   Vital Signs:   There were no vitals taken for this visit.    Physical Exam  Neurological:      Mental Status: He is oriented to person, place, and time. Mental status is at baseline.   Psychiatric:         Behavior: Behavior is cooperative.         Thought Content: Thought content normal.         Cognition and Memory: Cognition and memory normal.         Judgment: Judgment normal.        Result Review :     The following data was reviewed by: CAROL Cherry on 09/28/2021:    Data reviewed: Previous note, medication history          Assessment and Plan    Problem List Items Addressed This Visit     None      Visit Diagnoses     ADHD (attention deficit hyperactivity disorder), inattentive type  (Chronic)   -  Primary          PHQ-9 Score:   PHQ-9 Total Score: 0    Depression Screening:  Patient screened positive for depression based on a PHQ-9 score of 0 on 9/28/2021. Follow-up recommendations include: Suicide Risk Assessment performed.       Tobacco Cessation:  Patient is a former tobacco user. No tobacco cessation education necessary.      Impression/Plan:  -This is a follow-up appointment.  Patient presents today reports he feels as though he has been struggling some with his ADHD since the restart of  classes.  Patient's was previously able to just take his Adderall once a day during his undergrad studies.  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  He reports he has not been able to do this successfully, because his Adderall is worn off well before the evening.  -Increase Adderall to 10 mg twice daily.  -The patient is being prescribed a controlled substance as part of the treatment plan. The patient/guardian has been educated of appropriate use of the medication(s), including risks and side effects such as insomnia, headache, exacerbation of tics, nervousness, irritability, overstimulation, tremor, dizziness, anorexia or change in appetite, nausea, dry mouth, constipation, diarrhea, weight loss, sexual dysfunction, psychotic episodes, seizures, palpitations, tachycardia, hypertension, rare activation (activation of hypomania, toni, and/or suicidal ideations), cardiovascular adverse effects including sudden death (especially patients with pre-existing structural abnormalities often associated with a family history of cardiac disease), may slow normal growth in children, weight gain is reported but not expected, sedation is possible but activation is much more common, metabolic adversities, and overdose among others. Patient/guardian is also informed that the medication is to be used by the patient only, the medication is to be used only as directed, and the medication should not be combined with other substances unless directed by a Provider/Prescriber. The patient/guardian verbalized understanding and agreement with this in their own words, and wish to continue with current treatment plan.  Controlled substance agreement completed and filed in the patient's chart.  -Patient's ROWENA report reviewed and deemed appropriate.  The patient/guardian endorses medication is taken as prescribed, and denies  any abuse or misuse of the medication.  The patient/guardian denies any other substance use or issues.  There are no apparent substance related issues.  The patient reports no adverse effects of the current medication usage and is appropriate to continue with current medication usage at this time.  Scheduled medications can be prescribed by one provider at a time and dispensed from one facility at a time.  They can only be taken as prescribed, and we are not obligated to refill them if lost or stolen.  Reinforced risks and side effects of medication usage, patient and/or guardian verbalize understanding in their own words and are in agreement with current plan.  -Schedule follow-up for 1 month or as needed.    MEDS ORDERED DURING VISIT:  No orders of the defined types were placed in this encounter.        Follow Up   Return in about 1 month (around 10/28/2021), or if symptoms worsen or fail to improve, for Next scheduled follow up, Video visit.  Patient was given instructions and counseling regarding his condition or for health maintenance advice. Please see specific information pulled into the AVS if appropriate.       TREATMENT PLAN/GOALS: Continue supportive psychotherapy efforts and medications as indicated. Treatment and medication options discussed during today's visit. Patient acknowledged and verbally consented to continue with current treatment plan and was educated on the importance of compliance with treatment and follow-up appointments.    MEDICATION ISSUES:  Discussed medication options and treatment plan of prescribed medication as well as the risks, benefits, and side effects including potential falls, possible impaired driving and metabolic adversities among others. Patient is agreeable to call the office with any worsening of symptoms or onset of side effects. Patient is agreeable to call 911 or go to the nearest ER should he/she begin having SI/HI.          This document has been electronically  signed by CAROL Joshua, PMHNP-BC  September 28, 2021 13:58 EDT      Part of this note may be an electronic transcription/translation of spoken language to printed text using the Dragon Dictation System.

## 2021-10-28 DIAGNOSIS — F90.0 ADHD (ATTENTION DEFICIT HYPERACTIVITY DISORDER), INATTENTIVE TYPE: Chronic | ICD-10-CM

## 2021-10-29 RX ORDER — DEXTROAMPHETAMINE SACCHARATE, AMPHETAMINE ASPARTATE, DEXTROAMPHETAMINE SULFATE AND AMPHETAMINE SULFATE 2.5; 2.5; 2.5; 2.5 MG/1; MG/1; MG/1; MG/1
10 TABLET ORAL 2 TIMES DAILY
Qty: 60 TABLET | Refills: 0 | Status: SHIPPED | OUTPATIENT
Start: 2021-10-29 | End: 2021-11-29 | Stop reason: SDUPTHER

## 2021-11-02 ENCOUNTER — TELEMEDICINE (OUTPATIENT)
Dept: PSYCHIATRY | Facility: CLINIC | Age: 26
End: 2021-11-02

## 2021-11-02 DIAGNOSIS — F90.0 ADHD (ATTENTION DEFICIT HYPERACTIVITY DISORDER), INATTENTIVE TYPE: Primary | Chronic | ICD-10-CM

## 2021-11-02 PROCEDURE — 99213 OFFICE O/P EST LOW 20 MIN: CPT | Performed by: NURSE PRACTITIONER

## 2021-11-02 NOTE — PROGRESS NOTES
"This provider is located at [facility], Behavioral Health ,[address], [address] in [place], [place],using a secure MyChart Video Visit through [employer]. Patient is being seen remotely via telehealth at their home address in [place], and stated they are in a secure environment for this session. The patient's condition being diagnosed/treated is appropriate for telemedicine. The provider identified herself as well as her credentials.   The patient, and/or patients guardian, consent to be seen remotely, and when consent is given they understand that the consent allows for patient identifiable information to be sent to a third party as needed.   They may refuse to be seen remotely at any time. The electronic data is encrypted and password protected, and the patient and/or guardian has been advised of the potential risks to privacy not withstanding such measures.    Chief Complaint  ADHD      Subjective          Yobani Singer presents today via MyChart Video through [employer] for medication management of his ADHD.    History of Present Illness: Patient presents today for follow-up appointment after last being seen on 09/28/2021.  At previous appointment, the patient's Adderall was increased from once daily to twice daily dosing secondary to a worsening in his ADHD symptoms.  Patient reports today \"I am doing good\".  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  He reports that semester is starting to wrap up and that he is approximately 3 weeks of lecture material left before he has his first big test.  Patient reports he feels as though the semester has been going well, and that he is managing it well, but does say \"it has been more difficult than I even expected it to be\".  Patient reports the increase in Adderall at his last appointment \"was very helpful.  I am doing much better at staying on top of things, and focusing and concentrating on things\".  Patient " denies any new or significant issues with sleep or appetite.  Patient denies any SI/HI, A/V hallucinations.    Current Medications:   Current Outpatient Medications   Medication Sig Dispense Refill   • amphetamine-dextroamphetamine (Adderall) 10 MG tablet Take 1 tablet by mouth 2 (Two) Times a Day. 60 tablet 0     No current facility-administered medications for this visit.       Mental Status Exam:   Hygiene:   MyChart video  Cooperation:  Cooperative  Eye Contact:  Good  Psychomotor Behavior:  Appropriate  Affect:  Full range and Appropriate  Mood: normal and euthymic  Speech:  Normal  Thought Process:  Goal directed  Thought Content:  Mood congruent  Suicidal:  None  Homicidal:  None  Hallucinations:  None  Delusion:  None  Memory:  Intact  Orientation:  Person, Place, Time and Situation  Reliability:  good  Insight:  Good  Judgement:  Good  Impulse Control:  Good  Physical/Medical Issues:  No      Objective   Vital Signs:   There were no vitals taken for this visit.    Physical Exam  Neurological:      Mental Status: He is oriented to person, place, and time. Mental status is at baseline.   Psychiatric:         Behavior: Behavior is cooperative.         Thought Content: Thought content normal.         Cognition and Memory: Cognition and memory normal.         Judgment: Judgment normal.        Result Review :     The following data was reviewed by: CAROL Cherry on 11/02/2021:    Data reviewed: Previous note, medication history          Assessment and Plan    Problem List Items Addressed This Visit     None      Visit Diagnoses     ADHD (attention deficit hyperactivity disorder), inattentive type  (Chronic)   -  Primary          Tobacco Cessation:  Patient is a former tobacco user. No tobacco cessation education necessary.      Impression/Plan:  -This is a follow-up appointment.  Patient presents today and reports he has done very well since his last appointment.  He reports the medication changes  previously made have helped tremendously, and his ADHD symptoms have returned to their previously well-controlled levels.  Patient endorses doing very well with school, and is able to focus and concentrate, as well as complete tasks.  Patient endorses taking his medication on a daily basis as prescribed.  He denies any adverse effects associated with his medications.  Patient is happy with the progress he has made.  -Maintain Adderall 10 mg twice daily.  -The patient is being prescribed a controlled substance as part of the treatment plan. The patient/guardian has been educated of appropriate use of the medication(s), including risks and side effects such as insomnia, headache, exacerbation of tics, nervousness, irritability, overstimulation, tremor, dizziness, anorexia or change in appetite, nausea, dry mouth, constipation, diarrhea, weight loss, sexual dysfunction, psychotic episodes, seizures, palpitations, tachycardia, hypertension, rare activation (activation of hypomania, toni, and/or suicidal ideations), cardiovascular adverse effects including sudden death (especially patients with pre-existing structural abnormalities often associated with a family history of cardiac disease), may slow normal growth in children, weight gain is reported but not expected, sedation is possible but activation is much more common, metabolic adversities, and overdose among others. Patient/guardian is also informed that the medication is to be used by the patient only, the medication is to be used only as directed, and the medication should not be combined with other substances unless directed by a Provider/Prescriber. The patient/guardian verbalized understanding and agreement with this in their own words, and wish to continue with current treatment plan.  Controlled substance agreement completed and filed in the patient's chart.  -Patient's ROWENA report reviewed and deemed appropriate.  The patient/guardian endorses medication  is taken as prescribed, and denies any abuse or misuse of the medication.  The patient/guardian denies any other substance use or issues.  There are no apparent substance related issues.  The patient reports no adverse effects of the current medication usage and is appropriate to continue with current medication usage at this time.  Scheduled medications can be prescribed by one provider at a time and dispensed from one facility at a time.  They can only be taken as prescribed, and we are not obligated to refill them if lost or stolen.  Reinforced risks and side effects of medication usage, patient and/or guardian verbalize understanding in their own words and are in agreement with current plan.  -Schedule follow-up for 3 months or as needed.    MEDS ORDERED DURING VISIT:  No orders of the defined types were placed in this encounter.        Follow Up   Return in about 3 months (around 2/2/2022), or if symptoms worsen or fail to improve, for Next scheduled follow up, Video visit.  Patient was given instructions and counseling regarding his condition or for health maintenance advice. Please see specific information pulled into the AVS if appropriate.       TREATMENT PLAN/GOALS: Continue supportive psychotherapy efforts and medications as indicated. Treatment and medication options discussed during today's visit. Patient acknowledged and verbally consented to continue with current treatment plan and was educated on the importance of compliance with treatment and follow-up appointments.    MEDICATION ISSUES:  Discussed medication options and treatment plan of prescribed medication as well as the risks, benefits, and side effects including potential falls, possible impaired driving and metabolic adversities among others. Patient is agreeable to call the office with any worsening of symptoms or onset of side effects. Patient is agreeable to call 911 or go to the nearest ER should he/she begin having SI/HI.          This  document has been electronically signed by CAROL Joshua, PMHNP-BC  November 2, 2021 12:48 EDT      Part of this note may be an electronic transcription/translation of spoken language to printed text using the Dragon Dictation System.

## 2021-11-29 DIAGNOSIS — F90.0 ADHD (ATTENTION DEFICIT HYPERACTIVITY DISORDER), INATTENTIVE TYPE: Chronic | ICD-10-CM

## 2021-11-29 RX ORDER — DEXTROAMPHETAMINE SACCHARATE, AMPHETAMINE ASPARTATE, DEXTROAMPHETAMINE SULFATE AND AMPHETAMINE SULFATE 2.5; 2.5; 2.5; 2.5 MG/1; MG/1; MG/1; MG/1
10 TABLET ORAL 2 TIMES DAILY
Qty: 60 TABLET | Refills: 0 | Status: SHIPPED | OUTPATIENT
Start: 2021-11-29 | End: 2021-12-29 | Stop reason: SDUPTHER

## 2021-12-29 DIAGNOSIS — F90.0 ADHD (ATTENTION DEFICIT HYPERACTIVITY DISORDER), INATTENTIVE TYPE: Chronic | ICD-10-CM

## 2021-12-29 RX ORDER — DEXTROAMPHETAMINE SACCHARATE, AMPHETAMINE ASPARTATE, DEXTROAMPHETAMINE SULFATE AND AMPHETAMINE SULFATE 2.5; 2.5; 2.5; 2.5 MG/1; MG/1; MG/1; MG/1
10 TABLET ORAL 2 TIMES DAILY
Qty: 60 TABLET | Refills: 0 | Status: SHIPPED | OUTPATIENT
Start: 2021-12-29 | End: 2022-02-01 | Stop reason: SDUPTHER

## 2022-01-12 ENCOUNTER — TELEMEDICINE (OUTPATIENT)
Dept: PSYCHIATRY | Facility: CLINIC | Age: 27
End: 2022-01-12

## 2022-01-12 DIAGNOSIS — F90.0 ADHD (ATTENTION DEFICIT HYPERACTIVITY DISORDER), INATTENTIVE TYPE: Primary | Chronic | ICD-10-CM

## 2022-01-12 PROCEDURE — 99213 OFFICE O/P EST LOW 20 MIN: CPT | Performed by: NURSE PRACTITIONER

## 2022-01-12 NOTE — PROGRESS NOTES
"This provider is located at [facility], Behavioral Health ,[address], [address] in [place], [place],using a secure MyChart Video Visit through [employer]. Patient is being seen remotely via telehealth at their home address in [place], and stated they are in a secure environment for this session. The patient's condition being diagnosed/treated is appropriate for telemedicine. The provider identified herself as well as her credentials.   The patient, and/or patients guardian, consent to be seen remotely, and when consent is given they understand that the consent allows for patient identifiable information to be sent to a third party as needed.   They may refuse to be seen remotely at any time. The electronic data is encrypted and password protected, and the patient and/or guardian has been advised of the potential risks to privacy not withstanding such measures.    Chief Complaint  ADHD      Subjective          Yobani Singer presents today via MyChart Video through [employer] for medication management of his ADHD.    History of Present Illness: Patient presents today for follow-up appointment after last being seen on 11/02/2021.  Patient reports today \"I am doing pretty good\".  He says school has kept him very busy, but he is glad to be busy.  He had a good break, but is ready to get back into the group of classes.  He reports he did not have the grades he wanted last semester, and is looking forward to buckling down and getting better grades this semester.  He reports he had a good Bro, and was able to spend time with family.  But his parents had COVID over the holidays, so he did not get to do much.  He reports he took his Adderall once daily over the winter break, but since restarting classes he is taking it twice a day again.  He reports it continues to work well for his ADHD symptoms, and has no issues with his medication.  Patient denies any new or significant issues with sleep or " appetite.  Patient denies any SI/HI, A/V hallucinations.    Current Medications:   Current Outpatient Medications   Medication Sig Dispense Refill   • amphetamine-dextroamphetamine (Adderall) 10 MG tablet Take 1 tablet by mouth 2 (Two) Times a Day. 60 tablet 0     No current facility-administered medications for this visit.       Mental Status Exam:   Hygiene:   MyChart video  Cooperation:  Cooperative  Eye Contact:  Good  Psychomotor Behavior:  Appropriate  Affect:  Full range and Appropriate  Mood: normal and euthymic  Speech:  Normal  Thought Process:  Goal directed  Thought Content:  Mood congruent  Suicidal:  None  Homicidal:  None  Hallucinations:  None  Delusion:  None  Memory:  Intact  Orientation:  Person, Place, Time and Situation  Reliability:  good  Insight:  Good  Judgement:  Good  Impulse Control:  Good  Physical/Medical Issues:  No      Objective   Vital Signs:   There were no vitals taken for this visit.    Physical Exam  Neurological:      Mental Status: He is oriented to person, place, and time. Mental status is at baseline.   Psychiatric:         Behavior: Behavior is cooperative.        Result Review :     The following data was reviewed by: CAROL Cherry on 01/12/2022:    Data reviewed: Previous note, medication history          Assessment and Plan    Problem List Items Addressed This Visit     None      Visit Diagnoses     ADHD (attention deficit hyperactivity disorder), inattentive type  (Chronic)   -  Primary          PHQ-9 Score:   PHQ-9 Total Score: 0    Depression Screening:  Patient screened positive for depression based on a PHQ-9 score of 0 on 1/12/2022. Follow-up recommendations include: Suicide Risk Assessment performed.       Tobacco Cessation:  Patient has denied an present or past tobacco use. No tobacco cessation education necessary.       Impression/Plan:  -This is a follow-up appointment.  Patient presents today and reports he has been doing well since his last  appointment.  He endorses taking his medication on a daily basis as prescribed, and denies any adverse effects associated with it.  He is happy with the progress he has made, and happy with where his current medication regimen is.  He has restarted classes for the spring semester, and says he is looking forward to getting back into the groove.  -Maintain Adderall 10 mg twice daily.  -The patient is being prescribed a controlled substance as part of the treatment plan. The patient/guardian has been educated of appropriate use of the medication(s), including risks and side effects such as insomnia, headache, exacerbation of tics, nervousness, irritability, overstimulation, tremor, dizziness, anorexia or change in appetite, nausea, dry mouth, constipation, diarrhea, weight loss, sexual dysfunction, psychotic episodes, seizures, palpitations, tachycardia, hypertension, rare activation (activation of hypomania, toni, and/or suicidal ideations), cardiovascular adverse effects including sudden death (especially patients with pre-existing structural abnormalities often associated with a family history of cardiac disease), may slow normal growth in children, weight gain is reported but not expected, sedation is possible but activation is much more common, metabolic adversities, and overdose among others. Patient/guardian is also informed that the medication is to be used by the patient only, the medication is to be used only as directed, and the medication should not be combined with other substances unless directed by a Provider/Prescriber. The patient/guardian verbalized understanding and agreement with this in their own words, and wish to continue with current treatment plan.  Controlled substance agreement completed and filed in the patient's chart.  -Patient's ROWENA report reviewed and deemed appropriate.  -Most recent UDS performed 08/03/2021.  -Schedule MyChart video follow-up for 3 months or as needed.    MEDS  ORDERED DURING VISIT:  No orders of the defined types were placed in this encounter.        Follow Up   Return in about 3 months (around 4/12/2022), or if symptoms worsen or fail to improve, for Next scheduled follow up.  Patient was given instructions and counseling regarding his condition or for health maintenance advice. Please see specific information pulled into the AVS if appropriate.       TREATMENT PLAN/GOALS: Continue supportive psychotherapy efforts and medications as indicated. Treatment and medication options discussed during today's visit. Patient acknowledged and verbally consented to continue with current treatment plan and was educated on the importance of compliance with treatment and follow-up appointments.    MEDICATION ISSUES:  Discussed medication options and treatment plan of prescribed medication as well as the risks, benefits, and side effects including potential falls, possible impaired driving and metabolic adversities among others. Patient is agreeable to call the office with any worsening of symptoms or onset of side effects. Patient is agreeable to call 911 or go to the nearest ER should he/she begin having SI/HI.          This document has been electronically signed by CAROL Joshua, PMHNP-BC  January 12, 2022 14:22 EST      Part of this note may be an electronic transcription/translation of spoken language to printed text using the Dragon Dictation System.

## 2022-02-01 DIAGNOSIS — F90.0 ADHD (ATTENTION DEFICIT HYPERACTIVITY DISORDER), INATTENTIVE TYPE: Chronic | ICD-10-CM

## 2022-02-01 RX ORDER — DEXTROAMPHETAMINE SACCHARATE, AMPHETAMINE ASPARTATE, DEXTROAMPHETAMINE SULFATE AND AMPHETAMINE SULFATE 2.5; 2.5; 2.5; 2.5 MG/1; MG/1; MG/1; MG/1
10 TABLET ORAL 2 TIMES DAILY
Qty: 60 TABLET | Refills: 0 | Status: SHIPPED | OUTPATIENT
Start: 2022-02-01 | End: 2022-02-28 | Stop reason: SDUPTHER

## 2022-02-01 NOTE — TELEPHONE ENCOUNTER
Chart review completed and medication refill approved.   Patient's ROWENA report reviewed and deemed appropriate.

## 2022-02-28 DIAGNOSIS — F90.0 ADHD (ATTENTION DEFICIT HYPERACTIVITY DISORDER), INATTENTIVE TYPE: Chronic | ICD-10-CM

## 2022-03-01 RX ORDER — DEXTROAMPHETAMINE SACCHARATE, AMPHETAMINE ASPARTATE, DEXTROAMPHETAMINE SULFATE AND AMPHETAMINE SULFATE 2.5; 2.5; 2.5; 2.5 MG/1; MG/1; MG/1; MG/1
10 TABLET ORAL 2 TIMES DAILY
Qty: 60 TABLET | Refills: 0 | Status: SHIPPED | OUTPATIENT
Start: 2022-03-01 | End: 2022-03-31 | Stop reason: SDUPTHER

## 2022-03-31 DIAGNOSIS — F90.0 ADHD (ATTENTION DEFICIT HYPERACTIVITY DISORDER), INATTENTIVE TYPE: Chronic | ICD-10-CM

## 2022-03-31 RX ORDER — DEXTROAMPHETAMINE SACCHARATE, AMPHETAMINE ASPARTATE, DEXTROAMPHETAMINE SULFATE AND AMPHETAMINE SULFATE 2.5; 2.5; 2.5; 2.5 MG/1; MG/1; MG/1; MG/1
10 TABLET ORAL 2 TIMES DAILY
Qty: 60 TABLET | Refills: 0 | Status: SHIPPED | OUTPATIENT
Start: 2022-03-31 | End: 2022-04-28 | Stop reason: SDUPTHER

## 2022-04-28 DIAGNOSIS — F90.0 ADHD (ATTENTION DEFICIT HYPERACTIVITY DISORDER), INATTENTIVE TYPE: Chronic | ICD-10-CM

## 2022-04-28 RX ORDER — DEXTROAMPHETAMINE SACCHARATE, AMPHETAMINE ASPARTATE, DEXTROAMPHETAMINE SULFATE AND AMPHETAMINE SULFATE 2.5; 2.5; 2.5; 2.5 MG/1; MG/1; MG/1; MG/1
10 TABLET ORAL 2 TIMES DAILY
Qty: 60 TABLET | Refills: 0 | Status: SHIPPED | OUTPATIENT
Start: 2022-04-28 | End: 2022-05-27 | Stop reason: SDUPTHER

## 2022-05-27 DIAGNOSIS — F90.0 ADHD (ATTENTION DEFICIT HYPERACTIVITY DISORDER), INATTENTIVE TYPE: Chronic | ICD-10-CM

## 2022-05-31 RX ORDER — DEXTROAMPHETAMINE SACCHARATE, AMPHETAMINE ASPARTATE, DEXTROAMPHETAMINE SULFATE AND AMPHETAMINE SULFATE 2.5; 2.5; 2.5; 2.5 MG/1; MG/1; MG/1; MG/1
10 TABLET ORAL 2 TIMES DAILY
Qty: 60 TABLET | Refills: 0 | Status: SHIPPED | OUTPATIENT
Start: 2022-05-31 | End: 2022-06-29 | Stop reason: DRUGHIGH

## 2022-06-03 ENCOUNTER — TELEMEDICINE (OUTPATIENT)
Dept: PSYCHIATRY | Facility: CLINIC | Age: 27
End: 2022-06-03

## 2022-06-03 DIAGNOSIS — F90.0 ADHD (ATTENTION DEFICIT HYPERACTIVITY DISORDER), INATTENTIVE TYPE: Primary | Chronic | ICD-10-CM

## 2022-06-03 PROCEDURE — 99213 OFFICE O/P EST LOW 20 MIN: CPT | Performed by: NURSE PRACTITIONER

## 2022-06-03 NOTE — PROGRESS NOTES
"This provider is located at [facility], Behavioral Health ,[address], [address] in [place], [place],using a secure MyChart Video Visit through [employer]. Patient is being seen remotely via telehealth at their home address in [place], and stated they are in a secure environment for this session. The patient's condition being diagnosed/treated is appropriate for telemedicine. The provider identified herself as well as her credentials.   The patient, and/or patients guardian, consent to be seen remotely, and when consent is given they understand that the consent allows for patient identifiable information to be sent to a third party as needed.   They may refuse to be seen remotely at any time. The electronic data is encrypted and password protected, and the patient and/or guardian has been advised of the potential risks to privacy not withstanding such measures.    Chief Complaint  ADHD      Subjective          Yobani Singer presents today via MyChart Video through [employer] for medication management of his ADHD.    History of Present Illness: Patient presents today for follow-up appointment after last being seen on 01/12/2022.  Patient says \"I am doing pretty good\".  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  He says he is learning a lot and says \"it has been more hands-on than I thought it would be\".  He is not taking any classes this summer after initially thinking that he would.  He says he is glad to have the break.  He has continued to take his Adderall on a daily basis and says it continues to work well for his symptoms.  He does express an interest in trying an extended release option.  Patient reports he is sleeping and eating well and denies issues with either.  He denies any mood or anxiety related concerns.  Patient denies any SI/HI, A/V hallucinations.    Current Medications: "   Current Outpatient Medications   Medication Sig Dispense Refill   • amphetamine-dextroamphetamine (Adderall) 10 MG tablet Take 1 tablet by mouth 2 (Two) Times a Day. 60 tablet 0     No current facility-administered medications for this visit.       Mental Status Exam:   Hygiene:   MyChart video  Cooperation:  Cooperative  Eye Contact:  Good  Psychomotor Behavior:  Appropriate  Affect:  Appropriate  Mood: euthymic  Speech:  Normal  Thought Process:  Goal directed  Thought Content:  Mood congruent  Suicidal:  None  Homicidal:  None  Hallucinations:  None  Delusion:  None  Memory:  Intact  Orientation:  Person, Place, Time and Situation  Reliability:  good  Insight:  Good  Judgement:  Good  Impulse Control:  Good  Physical/Medical Issues:  No      Objective   Vital Signs:   There were no vitals taken for this visit.    Physical Exam  Neurological:      Mental Status: He is oriented to person, place, and time. Mental status is at baseline.   Psychiatric:         Behavior: Behavior is cooperative.        Result Review :     The following data was reviewed by: CAROL Cherry on 06/03/2022:    Data reviewed: Previous note, medication history          Assessment and Plan    Problem List Items Addressed This Visit    None     Visit Diagnoses     ADHD (attention deficit hyperactivity disorder), inattentive type  (Chronic)   -  Primary        .    Tobacco Cessation:  Patient has denied an present or past tobacco use. No tobacco cessation education necessary.       Impression/Plan:  -This is a follow-up appointment.  Patient presents today and reports he has been doing well.  He endorses taking his Adderall twice daily as prescribed, and denies any adverse effects associated with it.  He feels it has continued to control his symptom burden well.  He does express an interest in switching to an extended release option.  Discussed options, and recommended switching to Adderall XR 20 mg daily at his next refill.  Patient  just refilled his medication this week, so I advised him it would have to be at the end of June.  He expresses understanding.  Advised patient we would switch at that time, and then follow up 1 month later to see how he is doing with it.  Patient is in agreement with this plan.  -At refill, switch to Adderall XR 20 mg daily.  -The patient is being prescribed a controlled substance as part of the treatment plan. The patient/guardian has been educated of appropriate use of the medication(s), including risks and side effects such as insomnia, headache, exacerbation of tics, nervousness, irritability, overstimulation, tremor, dizziness, anorexia or change in appetite, nausea, dry mouth, constipation, diarrhea, weight loss, sexual dysfunction, psychotic episodes, seizures, palpitations, tachycardia, hypertension, rare activation (activation of hypomania, toni, and/or suicidal ideations), cardiovascular adverse effects including sudden death (especially patients with pre-existing structural abnormalities often associated with a family history of cardiac disease), may slow normal growth in children, weight gain is reported but not expected, sedation is possible but activation is much more common, metabolic adversities, and overdose among others. Patient/guardian is also informed that the medication is to be used by the patient only, the medication is to be used only as directed, and the medication should not be combined with other substances unless directed by a Provider/Prescriber. The patient/guardian verbalized understanding and agreement with this in their own words, and wish to continue with current treatment plan.  Controlled substance agreement completed and filed in the patient's chart.  -Patient's ROWENA report reviewed and deemed appropriate.  UDS performed 08/03/2021.  -Schedule in person follow-up appointment for 8 weeks or as needed.  UDS at follow-up.      MEDS ORDERED DURING VISIT:  No orders of the defined  types were placed in this encounter.        Follow Up   Return in 8 weeks (on 7/27/2022), or if symptoms worsen or fail to improve, for Next scheduled follow up, Urine Drug Screen.  Patient was given instructions and counseling regarding his condition or for health maintenance advice. Please see specific information pulled into the AVS if appropriate.       TREATMENT PLAN/GOALS: Continue supportive psychotherapy efforts and medications as indicated. Treatment and medication options discussed during today's visit. Patient acknowledged and verbally consented to continue with current treatment plan and was educated on the importance of compliance with treatment and follow-up appointments.    MEDICATION ISSUES:  Discussed medication options and treatment plan of prescribed medication as well as the risks, benefits, and side effects including potential falls, possible impaired driving and metabolic adversities among others. Patient is agreeable to call the office with any worsening of symptoms or onset of side effects. Patient is agreeable to call 911 or go to the nearest ER should he/she begin having SI/HI.          This document has been electronically signed by CAROL Joshua, PMHNP-BC  Kelly 3, 2022 10:58 EDT      Part of this note may be an electronic transcription/translation of spoken language to printed text using the Dragon Dictation System.

## 2022-06-29 DIAGNOSIS — F90.0 ADHD (ATTENTION DEFICIT HYPERACTIVITY DISORDER), INATTENTIVE TYPE: Primary | ICD-10-CM

## 2022-06-29 RX ORDER — DEXTROAMPHETAMINE SACCHARATE, AMPHETAMINE ASPARTATE MONOHYDRATE, DEXTROAMPHETAMINE SULFATE AND AMPHETAMINE SULFATE 5; 5; 5; 5 MG/1; MG/1; MG/1; MG/1
20 CAPSULE, EXTENDED RELEASE ORAL DAILY
Qty: 30 CAPSULE | Refills: 0 | Status: SHIPPED | OUTPATIENT
Start: 2022-06-29 | End: 2022-07-27 | Stop reason: SDUPTHER

## 2022-06-29 NOTE — TELEPHONE ENCOUNTER
Pt called for a refill he said last time you all spoke about changing med for adderall xr 20 mg daily. brittnee in media    Rx Refill Note  Requested Prescriptions     Pending Prescriptions Disp Refills   • amphetamine-dextroamphetamine XR (ADDERALL XR) 20 MG 24 hr capsule 30 capsule 0     Sig: Take 1 capsule by mouth Daily      Last office visit with prescribing clinician: 8/3/2021      Next office visit with prescribing clinician: 8/3/2022            Breanna Morrison CMA  06/29/22, 10:10 EDT

## 2022-07-27 DIAGNOSIS — F90.0 ADHD (ATTENTION DEFICIT HYPERACTIVITY DISORDER), INATTENTIVE TYPE: ICD-10-CM

## 2022-07-28 RX ORDER — DEXTROAMPHETAMINE SACCHARATE, AMPHETAMINE ASPARTATE MONOHYDRATE, DEXTROAMPHETAMINE SULFATE AND AMPHETAMINE SULFATE 5; 5; 5; 5 MG/1; MG/1; MG/1; MG/1
20 CAPSULE, EXTENDED RELEASE ORAL DAILY
Qty: 30 CAPSULE | Refills: 0 | Status: SHIPPED | OUTPATIENT
Start: 2022-07-28 | End: 2022-08-26 | Stop reason: SDUPTHER

## 2022-08-01 ENCOUNTER — OFFICE VISIT (OUTPATIENT)
Dept: PSYCHIATRY | Facility: CLINIC | Age: 27
End: 2022-08-01

## 2022-08-01 VITALS
HEIGHT: 70 IN | HEART RATE: 67 BPM | WEIGHT: 167 LBS | SYSTOLIC BLOOD PRESSURE: 116 MMHG | BODY MASS INDEX: 23.91 KG/M2 | DIASTOLIC BLOOD PRESSURE: 74 MMHG

## 2022-08-01 DIAGNOSIS — Z79.899 MEDICATION MANAGEMENT: ICD-10-CM

## 2022-08-01 DIAGNOSIS — F90.0 ADHD (ATTENTION DEFICIT HYPERACTIVITY DISORDER), INATTENTIVE TYPE: Primary | Chronic | ICD-10-CM

## 2022-08-01 PROCEDURE — 99213 OFFICE O/P EST LOW 20 MIN: CPT | Performed by: NURSE PRACTITIONER

## 2022-08-01 NOTE — PROGRESS NOTES
"Chief Complaint  ADHD    Subjective          Yobani Singer presents to [facility] for medication management of his ADHD.    History of Present Illness: Patient presents today for follow-up appointment after last being seen on 06/03/2022.  Patient says \"I think I have been doing pretty good\".  He says the transition to Adderall XR has gone well.  He says it has been an adjustment, especially learning when would be the best time for him to take it in the morning.  He says he does like there is no up and down feeling throughout the day and says \"I prefer it to the hard crashes from the short acting medication\".  He says he has noticed he is eating less during the day because there is not a gap of time between when he takes both of the doses, however says it has not been significant.  He reports he has been sleeping well.  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  He says classes start back up at the end of August and is looking forward to it.  He says he was already able to get his accommodations for testing taking care of this fall, and is happy to have it out of the way.  Patient denies any concerns regarding mood or anxiety.  Patient denies any SI/HI, A/V hallucinations.    Current Medications:   Current Outpatient Medications   Medication Sig Dispense Refill   • amphetamine-dextroamphetamine XR (ADDERALL XR) 20 MG 24 hr capsule Take 1 capsule by mouth Daily 30 capsule 0     No current facility-administered medications for this visit.       Mental Status Exam:   Hygiene:   good  Cooperation:  Cooperative  Eye Contact:  Good  Psychomotor Behavior:  Appropriate  Affect:  Appropriate  Mood: euthymic  Speech:  Normal  Thought Process:  Goal directed  Thought Content:  Mood congruent  Suicidal:  None  Homicidal:  None  Hallucinations:  None  Delusion:  None  Memory:  Intact  Orientation:  Person, Place, Time and " "Situation  Reliability:  good  Insight:  Good  Judgement:  Good  Impulse Control:  Good  Physical/Medical Issues:  No        Objective   Vital Signs:   /74   Pulse 67   Ht 177.8 cm (70\")   Wt 75.8 kg (167 lb)   BMI 23.96 kg/m²     Physical Exam  Neurological:      Mental Status: He is oriented to person, place, and time. Mental status is at baseline.      Coordination: Coordination is intact.      Gait: Gait is intact.   Psychiatric:         Behavior: Behavior is cooperative.        Result Review :     The following data was reviewed by: CAROL Cherry on 08/01/2022:    Data reviewed: Previous note, medication history          Assessment and Plan    Problem List Items Addressed This Visit    None     Visit Diagnoses     ADHD (attention deficit hyperactivity disorder), inattentive type  (Chronic)   -  Primary    Medication management        Relevant Orders    Compliance Drug Analysis, Ur - Urine, Clean Catch          PHQ-9 Score:   PHQ-9 Total Score: 0      Depression Screening:  Patient screened positive for depression based on a PHQ-9 score of 0 on 8/1/2022. Follow-up recommendations include: Suicide Risk Assessment performed.        Tobacco Cessation:  Patient is a former tobacco user. No tobacco cessation education necessary.      Impression/Plan:  -This is a follow-up appointment.  Patient presents today and reports he has been doing well since his last appointment.  He endorses taking his medication as prescribed, and denies any adverse effects associated with it he feels this medication regimen has been very beneficial and is happy with how well he has been doing.  -Maintain Adderall XR 20 mg daily.  -The patient is being prescribed a controlled substance as part of the treatment plan. The patient/guardian has been educated of appropriate use of the medication(s), including risks and side effects such as insomnia, headache, exacerbation of tics, nervousness, irritability, overstimulation, " tremor, dizziness, anorexia or change in appetite, nausea, dry mouth, constipation, diarrhea, weight loss, sexual dysfunction, psychotic episodes, seizures, palpitations, tachycardia, hypertension, rare activation (activation of hypomania, toni, and/or suicidal ideations), cardiovascular adverse effects including sudden death (especially patients with pre-existing structural abnormalities often associated with a family history of cardiac disease), may slow normal growth in children, weight gain is reported but not expected, sedation is possible but activation is much more common, metabolic adversities, and overdose among others. Patient/guardian is also informed that the medication is to be used by the patient only, the medication is to be used only as directed, and the medication should not be combined with other substances unless directed by a Provider/Prescriber. The patient/guardian verbalized understanding and agreement with this in their own words, and wish to continue with current treatment plan.  Controlled substance agreement completed and filed in the patient's chart.  -Patient's ROWENA report reviewed and deemed appropriate.  UDS performed today.  -Reviewed available lab work.  -Schedule MyChart video follow-up appointment for 3 months or as needed.      MEDS ORDERED DURING VISIT:  No orders of the defined types were placed in this encounter.        Follow Up   Return in about 3 months (around 11/1/2022), or if symptoms worsen or fail to improve, for Next scheduled follow up, Video visit.  Patient was given instructions and counseling regarding his condition or for health maintenance advice. Please see specific information pulled into the AVS if appropriate.       TREATMENT PLAN/GOALS: Continue supportive psychotherapy efforts and medications as indicated. Treatment and medication options discussed during today's visit. Patient acknowledged and verbally consented to continue with current treatment plan  and was educated on the importance of compliance with treatment and follow-up appointments.    MEDICATION ISSUES:  Discussed medication options and treatment plan of prescribed medication as well as the risks, benefits, and side effects including potential falls, possible impaired driving and metabolic adversities among others. Patient is agreeable to call the office with any worsening of symptoms or onset of side effects. Patient is agreeable to call 911 or go to the nearest ER should he/she begin having SI/HI.          This document has been electronically signed by CAROL Joshua, PMHNP-BC  August 1, 2022 10:35 EDT      Part of this note may be an electronic transcription/translation of spoken language to printed text using the Dragon Dictation System.

## 2022-08-04 LAB — DRUGS UR: NORMAL

## 2022-08-26 DIAGNOSIS — F90.0 ADHD (ATTENTION DEFICIT HYPERACTIVITY DISORDER), INATTENTIVE TYPE: ICD-10-CM

## 2022-08-26 RX ORDER — DEXTROAMPHETAMINE SACCHARATE, AMPHETAMINE ASPARTATE MONOHYDRATE, DEXTROAMPHETAMINE SULFATE AND AMPHETAMINE SULFATE 5; 5; 5; 5 MG/1; MG/1; MG/1; MG/1
20 CAPSULE, EXTENDED RELEASE ORAL DAILY
Qty: 30 CAPSULE | Refills: 0 | Status: SHIPPED | OUTPATIENT
Start: 2022-08-26 | End: 2022-09-22 | Stop reason: SDUPTHER

## 2022-08-26 NOTE — TELEPHONE ENCOUNTER
Chart review completed and medication refill approved.   Patient's ROWENA report reviewed and deemed appropriate.     210.1

## 2022-09-22 DIAGNOSIS — F90.0 ADHD (ATTENTION DEFICIT HYPERACTIVITY DISORDER), INATTENTIVE TYPE: ICD-10-CM

## 2022-09-23 RX ORDER — DEXTROAMPHETAMINE SACCHARATE, AMPHETAMINE ASPARTATE MONOHYDRATE, DEXTROAMPHETAMINE SULFATE AND AMPHETAMINE SULFATE 5; 5; 5; 5 MG/1; MG/1; MG/1; MG/1
20 CAPSULE, EXTENDED RELEASE ORAL DAILY
Qty: 30 CAPSULE | Refills: 0 | Status: SHIPPED | OUTPATIENT
Start: 2022-09-23 | End: 2022-10-19 | Stop reason: SDUPTHER

## 2022-10-19 DIAGNOSIS — F90.0 ADHD (ATTENTION DEFICIT HYPERACTIVITY DISORDER), INATTENTIVE TYPE: ICD-10-CM

## 2022-10-20 ENCOUNTER — TELEPHONE (OUTPATIENT)
Dept: PSYCHIATRY | Facility: CLINIC | Age: 27
End: 2022-10-20

## 2022-10-20 DIAGNOSIS — F90.0 ADHD (ATTENTION DEFICIT HYPERACTIVITY DISORDER), INATTENTIVE TYPE: ICD-10-CM

## 2022-10-20 RX ORDER — DEXTROAMPHETAMINE SACCHARATE, AMPHETAMINE ASPARTATE MONOHYDRATE, DEXTROAMPHETAMINE SULFATE AND AMPHETAMINE SULFATE 5; 5; 5; 5 MG/1; MG/1; MG/1; MG/1
20 CAPSULE, EXTENDED RELEASE ORAL DAILY
Qty: 30 CAPSULE | Refills: 0 | Status: SHIPPED | OUTPATIENT
Start: 2022-10-20 | End: 2022-11-15 | Stop reason: SDUPTHER

## 2022-10-20 RX ORDER — DEXTROAMPHETAMINE SACCHARATE, AMPHETAMINE ASPARTATE MONOHYDRATE, DEXTROAMPHETAMINE SULFATE AND AMPHETAMINE SULFATE 5; 5; 5; 5 MG/1; MG/1; MG/1; MG/1
20 CAPSULE, EXTENDED RELEASE ORAL DAILY
Qty: 30 CAPSULE | Refills: 0 | Status: SHIPPED | OUTPATIENT
Start: 2022-10-20 | End: 2022-10-20 | Stop reason: SDUPTHER

## 2022-10-20 NOTE — TELEPHONE ENCOUNTER
I have sent the prescription to [facility].  Will you please ensure the order at [facility] has been discontinued?  Thank you

## 2022-10-20 NOTE — TELEPHONE ENCOUNTER
Called [facility] and had them to cancel the prescription. He had picked it up at [facility] after saying they did not have it.

## 2022-10-20 NOTE — TELEPHONE ENCOUNTER
Patient called stating his pharmacy is out of the adderall he is prescribed and wants to know if you could send it to [facility] instead I put the [facility] pharmacy in his chart.

## 2022-10-20 NOTE — TELEPHONE ENCOUNTER
Chart review completed and medication refill approved.   Patient's ROWENA report reviewed and deemed appropriate.  Patient counseled on use of controlled substances.

## 2022-10-24 ENCOUNTER — HOSPITAL ENCOUNTER (EMERGENCY)
Facility: HOSPITAL | Age: 27
Discharge: HOME | End: 2022-10-24
Attending: EMERGENCY MEDICINE | Admitting: EMERGENCY MEDICINE

## 2022-10-24 VITALS
OXYGEN SATURATION: 96 % | DIASTOLIC BLOOD PRESSURE: 79 MMHG | SYSTOLIC BLOOD PRESSURE: 127 MMHG | BODY MASS INDEX: 22.4 KG/M2 | WEIGHT: 160 LBS | TEMPERATURE: 98 F | HEIGHT: 71 IN | RESPIRATION RATE: 18 BRPM | HEART RATE: 87 BPM

## 2022-10-24 DIAGNOSIS — Z20.818 EXPOSURE TO STREP THROAT: ICD-10-CM

## 2022-10-24 DIAGNOSIS — J02.9 PHARYNGITIS, UNSPECIFIED ETIOLOGY: Primary | ICD-10-CM

## 2022-10-24 LAB
FLUAV RNA RESP QL NAA+PROBE: NOT DETECTED
FLUBV RNA RESP QL NAA+PROBE: NOT DETECTED
S PYO AG THROAT QL: NEGATIVE
SARS-COV-2 RNA RESP QL NAA+PROBE: NOT DETECTED

## 2022-10-24 PROCEDURE — C9803 HOPD COVID-19 SPEC COLLECT: HCPCS

## 2022-10-24 PROCEDURE — 87081 CULTURE SCREEN ONLY: CPT | Performed by: EMERGENCY MEDICINE

## 2022-10-24 PROCEDURE — 99283 EMERGENCY DEPT VISIT LOW MDM: CPT

## 2022-10-24 PROCEDURE — 87880 STREP A ASSAY W/OPTIC: CPT | Performed by: EMERGENCY MEDICINE

## 2022-10-24 PROCEDURE — 87636 SARSCOV2 & INF A&B AMP PRB: CPT | Performed by: EMERGENCY MEDICINE

## 2022-10-24 RX ORDER — CEPHALEXIN 500 MG/1
500 CAPSULE ORAL 2 TIMES DAILY
Qty: 20 CAPSULE | Refills: 0 | Status: SHIPPED | OUTPATIENT
Start: 2022-10-24 | End: 2023-02-08

## 2022-10-24 NOTE — ED PROVIDER NOTES
Subjective   History of Present Illness  26-year-old male presents emergency department today complaining of having sore throat has had sore throat for about 2 days.  Patient reports he had no fevers no chills.  Has been around some of the did have strep.  His girlfriend tested positive.  He has had no nausea no vomiting associated with this.  He states that does seem to more pain in the morning gets better throughout the evening.  No chest pain no abdominal pain no other complaints.    History provided by:  Patient   used: No    Sore Throat  Location:  Posterior  Quality:  Sore  Severity:  Severe  Onset quality:  Gradual  Duration:  2 days  Timing:  Constant  Progression:  Worsening  Chronicity:  New  Relieved by:  Nothing  Worsened by:  Eating and smoking  Ineffective treatments:  None tried  Associated symptoms: no abdominal pain, no chills, no drooling, no ear discharge, no ear pain, no fever, no night sweats, no rash, no rhinorrhea, no shortness of breath, no stridor, no trouble swallowing and no voice change    Risk factors: exposure to strep        Review of Systems   Constitutional: Negative for chills, fever and night sweats.   HENT: Positive for sore throat. Negative for drooling, ear discharge, ear pain, rhinorrhea, trouble swallowing and voice change.    Respiratory: Negative for chest tightness, shortness of breath and stridor.    Gastrointestinal: Negative for abdominal pain.   Musculoskeletal: Negative for back pain and neck pain.   Skin: Negative for rash.   Psychiatric/Behavioral: Negative.    All other systems reviewed and are negative.      Past Medical History:   Diagnosis Date   • ADHD (attention deficit hyperactivity disorder)        Allergies   Allergen Reactions   • Azithromycin Hives       No past surgical history on file.    Family History   Problem Relation Age of Onset   • ADD / ADHD Neg Hx    • Alcohol abuse Neg Hx    • Anxiety disorder Neg Hx    • Bipolar disorder  Neg Hx    • Dementia Neg Hx    • Depression Neg Hx    • Drug abuse Neg Hx    • OCD Neg Hx    • Paranoid behavior Neg Hx    • Schizophrenia Neg Hx    • Seizures Neg Hx    • Self-Injurious Behavior  Neg Hx    • Suicide Attempts Neg Hx        Social History     Socioeconomic History   • Marital status: Single   Tobacco Use   • Smoking status: Former     Types: Electronic Cigarette   • Smokeless tobacco: Never   Vaping Use   • Vaping Use: Some days   • Substances: Nicotine   • Devices: Disposable   Substance and Sexual Activity   • Alcohol use: Yes     Comment: OCCASIONALLY   • Drug use: No   • Sexual activity: Defer           Objective   Physical Exam  Vitals and nursing note reviewed.   Constitutional:       Appearance: He is well-developed.   HENT:      Head: Normocephalic and atraumatic.      Right Ear: External ear normal.      Left Ear: External ear normal.      Nose: Congestion present.      Mouth/Throat:      Mouth: Mucous membranes are moist.      Comments: Erythematous  Eyes:      General: No scleral icterus.     Conjunctiva/sclera: Conjunctivae normal.      Pupils: Pupils are equal, round, and reactive to light.   Neck:      Thyroid: No thyromegaly.   Cardiovascular:      Rate and Rhythm: Normal rate and regular rhythm.      Heart sounds: Normal heart sounds.   Pulmonary:      Effort: Pulmonary effort is normal. No respiratory distress.      Breath sounds: Normal breath sounds. No wheezing or rales.   Chest:      Chest wall: No tenderness.   Abdominal:      General: Bowel sounds are normal. There is no distension.      Palpations: Abdomen is soft.      Tenderness: There is no abdominal tenderness.   Musculoskeletal:         General: Normal range of motion.      Cervical back: Normal range of motion.   Lymphadenopathy:      Cervical: No cervical adenopathy.   Skin:     General: Skin is warm and dry.   Neurological:      Mental Status: He is alert and oriented to person, place, and time.      Cranial Nerves: No  "cranial nerve deficit.      Coordination: Coordination normal.      Deep Tendon Reflexes: Reflexes are normal and symmetric. Reflexes normal.   Psychiatric:         Behavior: Behavior normal.         Thought Content: Thought content normal.         Judgment: Judgment normal.         Procedures           ED Course  ED Course as of 10/24/22 2109   Mon Oct 24, 2022   1549 He had your exposure to summated tested positive for strep.  We are going to treat him although his strep screen was negative and you Motrin for pain [JENNIFER]      ED Course User Index  [JENNIFER] Kelvin López PA           Recent Results (from the past 24 hour(s))   Rapid Strep A Screen - Swab, Throat    Collection Time: 10/24/22  3:05 PM    Specimen: Throat; Swab   Result Value Ref Range    Strep A Ag Negative Negative   COVID-19 and FLU A/B PCR - Swab, Nasopharynx    Collection Time: 10/24/22  3:05 PM    Specimen: Nasopharynx; Swab   Result Value Ref Range    COVID19 Not Detected Not Detected - Ref. Range    Influenza A PCR Not Detected Not Detected    Influenza B PCR Not Detected Not Detected     Note: In addition to lab results from this visit, the labs listed above may include labs taken at another facility or during a different encounter within the last 24 hours. Please correlate lab times with ED admission and discharge times for further clarification of the services performed during this visit.    No orders to display     Vitals:    10/24/22 1452   BP: 127/79   BP Location: Left arm   Patient Position: Sitting   Pulse: 87   Resp: 18   Temp: 98 °F (36.7 °C)   TempSrc: Oral   SpO2: 96%   Weight: 72.6 kg (160 lb)   Height: 180.3 cm (71\")     Medications - No data to display  ECG/EMG Results (last 24 hours)     ** No results found for the last 24 hours. **        No orders to display                                         MDM  Number of Diagnoses or Management Options  Exposure to strep throat: new and requires workup  Pharyngitis, unspecified " etiology: new and requires workup     Amount and/or Complexity of Data Reviewed  Clinical lab tests: reviewed and ordered  Tests in the medicine section of CPT®: ordered and reviewed  Discuss the patient with other providers: yes    Patient Progress  Patient progress: stable      Final diagnoses:   Pharyngitis, unspecified etiology   Exposure to strep throat       ED Disposition  ED Disposition     ED Disposition   Discharge    Condition   Stable    Comment   --             Bret Lima, FNP  [address] [id number] [phone]          [facility] Emergency Department  [address] [address] [phone]             Medication List      New Prescriptions    cephalexin 500 MG capsule  Commonly known as: KEFLEX  Take 1 capsule by mouth 2 (Two) Times a Day.           Where to Get Your Medications      These medications were sent to [facility] [id number] - [place], [place] - [address] - [phone]  - [phone]   [address], [facility] [address]    Phone: [phone]   · cephalexin 500 MG capsule          Kelvin López PA  10/24/22 8736

## 2022-10-26 LAB — BACTERIA SPEC AEROBE CULT: NORMAL

## 2022-11-15 DIAGNOSIS — F90.0 ADHD (ATTENTION DEFICIT HYPERACTIVITY DISORDER), INATTENTIVE TYPE: ICD-10-CM

## 2022-11-16 RX ORDER — DEXTROAMPHETAMINE SACCHARATE, AMPHETAMINE ASPARTATE MONOHYDRATE, DEXTROAMPHETAMINE SULFATE AND AMPHETAMINE SULFATE 5; 5; 5; 5 MG/1; MG/1; MG/1; MG/1
20 CAPSULE, EXTENDED RELEASE ORAL DAILY
Qty: 30 CAPSULE | Refills: 0 | Status: SHIPPED | OUTPATIENT
Start: 2022-11-16 | End: 2022-12-14 | Stop reason: SDUPTHER

## 2022-11-16 NOTE — TELEPHONE ENCOUNTER
Rx Refill Note  Requested Prescriptions     Pending Prescriptions Disp Refills   • amphetamine-dextroamphetamine XR (ADDERALL XR) 20 MG 24 hr capsule 30 capsule 0     Sig: Take 1 capsule by mouth Daily      Last office visit with prescribing clinician: 8/1/2022      Next office visit with prescribing clinician: 11/18/2022            Jazmine Marina MA  11/16/22, 07:56 EST

## 2022-11-16 NOTE — TELEPHONE ENCOUNTER
Colton called in asking about his refill, I advised that it was not due until 11/20, he has an appointment on 11/18, and that we may be waiting until the apt to send in, since that would be 2 days early. He understood. Thank you

## 2022-11-18 ENCOUNTER — TELEMEDICINE (OUTPATIENT)
Dept: PSYCHIATRY | Facility: CLINIC | Age: 27
End: 2022-11-18

## 2022-11-18 DIAGNOSIS — F90.0 ADHD (ATTENTION DEFICIT HYPERACTIVITY DISORDER), INATTENTIVE TYPE: Primary | Chronic | ICD-10-CM

## 2022-11-18 PROCEDURE — 99214 OFFICE O/P EST MOD 30 MIN: CPT | Performed by: NURSE PRACTITIONER

## 2022-11-18 RX ORDER — DEXTROAMPHETAMINE SACCHARATE, AMPHETAMINE ASPARTATE, DEXTROAMPHETAMINE SULFATE AND AMPHETAMINE SULFATE 1.875; 1.875; 1.875; 1.875 MG/1; MG/1; MG/1; MG/1
7.5 TABLET ORAL DAILY
Qty: 30 TABLET | Refills: 0 | Status: SHIPPED | OUTPATIENT
Start: 2022-11-18 | End: 2023-01-08 | Stop reason: SDUPTHER

## 2022-11-18 NOTE — PROGRESS NOTES
"This provider is located at [facility] ,[address], [address] in [place], [place],using a secure MyChart Video Visit through [employer]. Patient is being seen remotely via telehealth at their home address in [place], and stated they are in a secure environment for this session. The patient's condition being diagnosed/treated is appropriate for telemedicine. The provider identified herself as well as her credentials.   The patient, and/or patients guardian, consent to be seen remotely, and when consent is given they understand that the consent allows for patient identifiable information to be sent to a third party as needed.   They may refuse to be seen remotely at any time. The electronic data is encrypted and password protected, and the patient and/or guardian has been advised of the potential risks to privacy not withstanding such measures.    Chief Complaint  ADHD      Subjective          Yobani Singer presents today via [employer] Video through [employer] for medication management of his ADHD.    History of Present Illness: Patient presents today via [employer] video for follow-up appointment after last being seen on 08/01/2022.  Patient reports \"I am doing really good\".  He had to reschedule his last appointment secondary to being sick and missing several days of school.  Patient was afraid to miss another day for an appointment.  He feels he is doing well overall.  He has 2 weeks until finals, and says he is nervous about that, but says this semester has gone very well.  Patient says \"this year has really been off to a lot better start than last year was\".  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  He is looking forward to that.  Patient has continued to take his Adderall on a consistent basis.  He feels it is still working well for the most part, but does report he is finding himself struggling more in the " evenings.  Patient reports his medication lasts through the school day, however when he has to start studying and doing his readings in the evening, he is really starting to struggle.  He reports he is eating and sleeping well, and denies issues with either.  He denies any issues or struggles with mood or anxiety.  Patient denies any SI/HI, A/V hallucinations.      The following portions of the patient's history were reviewed and updated as appropriate: allergies, current medications, past family history, past medical history, past social history, past surgical history and problem list.      Current Medications:   Current Outpatient Medications   Medication Sig Dispense Refill   • amphetamine-dextroamphetamine (Adderall) 7.5 MG tablet Take 1 tablet by mouth Daily. 30 tablet 0   • amphetamine-dextroamphetamine XR (ADDERALL XR) 20 MG 24 hr capsule Take 1 capsule by mouth Daily 30 capsule 0   • cephalexin (KEFLEX) 500 MG capsule Take 1 capsule by mouth 2 (Two) Times a Day. 20 capsule 0     No current facility-administered medications for this visit.       Mental Status Exam:   Hygiene:   good  Cooperation:  Cooperative  Eye Contact:  Good  Psychomotor Behavior:  Appropriate  Affect:  Appropriate  Mood: euthymic  Speech:  Normal  Thought Process:  Goal directed  Thought Content:  Mood congruent  Suicidal:  None  Homicidal:  None  Hallucinations:  None  Delusion:  None  Memory:  Intact  Orientation:  Person, Place, Time and Situation  Reliability:  good  Insight:  Good  Judgement:  Good  Impulse Control:  Good  Physical/Medical Issues:  No      Objective   Vital Signs:   There were no vitals taken for this visit.    Physical Exam  Neurological:      Mental Status: He is oriented to person, place, and time. Mental status is at baseline.   Psychiatric:         Behavior: Behavior is cooperative.        Result Review :     The following data was reviewed by: CAROL Cherry on 11/18/2022:    Data reviewed: Previous  note, medication history          Assessment and Plan    Diagnoses and all orders for this visit:    1. ADHD (attention deficit hyperactivity disorder), inattentive type (Primary)  -     amphetamine-dextroamphetamine (Adderall) 7.5 MG tablet; Take 1 tablet by mouth Daily.  Dispense: 30 tablet; Refill: 0         Tobacco Cessation:  Patient has denied an present or past tobacco use. No tobacco cessation education necessary.       Impression/Plan:  -This is a follow-up appointment.  Patient presents today and reports he has been doing well overall since his last appointment.  He endorses taking his medication as prescribed, denies any adverse effects associated with them.  He does report he is struggling more in the evenings, and having a hard time getting his assigned readings and studying completed because his medication has worn off by that time.  Discussed adding an afternoon booster dose of short acting Adderall.  Patient is hesitant because he does not want to take more medication than he needs, but is willing to try  -Maintain Adderall XR 20 mg daily.  -Start Adderall IR 7.5 mg every afternoon.  Advised patient he does not have to take this every day, and that if he does not have assignments or readings do in the evening, he can skip it.  Advised him to take it around 2 PM when he does take it.  -The patient is being prescribed a controlled substance as part of the treatment plan. The patient/guardian has been educated of appropriate use of the medication(s), including risks and side effects such as insomnia, headache, exacerbation of tics, nervousness, irritability, overstimulation, tremor, dizziness, anorexia or change in appetite, nausea, dry mouth, constipation, diarrhea, weight loss, sexual dysfunction, psychotic episodes, seizures, palpitations, tachycardia, hypertension, rare activation (activation of hypomania, toni, and/or suicidal ideations), cardiovascular adverse effects including sudden death  (especially patients with pre-existing structural abnormalities often associated with a family history of cardiac disease), may slow normal growth in children, weight gain is reported but not expected, sedation is possible but activation is much more common, metabolic adversities, and overdose among others. Patient/guardian is also informed that the medication is to be used by the patient only, the medication is to be used only as directed, and the medication should not be combined with other substances unless directed by a Provider/Prescriber. The patient/guardian verbalized understanding and agreement with this in their own words, and wish to continue with current treatment plan.  Controlled substance agreement completed and filed in the patient's chart.  -Patient's ROWENA report reviewed and deemed appropriate.  Patient counseled on use of controlled substances.  UDS performed 08/01/2022.  -Reviewed available lab work.  -[facility] appointment for 4 weeks or as needed.    MEDS ORDERED DURING VISIT:  New Medications Ordered This Visit   Medications   • amphetamine-dextroamphetamine (Adderall) 7.5 MG tablet     Sig: Take 1 tablet by mouth Daily.     Dispense:  30 tablet     Refill:  0         Follow Up   Return in about 4 weeks (around 12/16/2022), or if symptoms worsen or fail to improve, for Next scheduled follow up, Video visit.  Patient was given instructions and counseling regarding his condition or for health maintenance advice. Please see specific information pulled into the AVS if appropriate.       TREATMENT PLAN/GOALS: Continue supportive psychotherapy efforts and medications as indicated. Treatment and medication options discussed during today's visit. Patient acknowledged and verbally consented to continue with current treatment plan and was educated on the importance of compliance with treatment and follow-up appointments.    MEDICATION ISSUES:  Discussed medication options and  treatment plan of prescribed medication as well as the risks, benefits, and side effects including potential falls, possible impaired driving and metabolic adversities among others. Patient is agreeable to call the office with any worsening of symptoms or onset of side effects. Patient is agreeable to call 911 or go to the nearest ER should he/she begin having SI/HI.          This document has been electronically signed by CAROL Joshua, PMHNP-BC  November 18, 2022 15:08 EST      Part of this note may be an electronic transcription/translation of spoken language to printed text using the Dragon Dictation System.

## 2022-12-14 DIAGNOSIS — F90.0 ADHD (ATTENTION DEFICIT HYPERACTIVITY DISORDER), INATTENTIVE TYPE: ICD-10-CM

## 2022-12-15 DIAGNOSIS — F90.0 ADHD (ATTENTION DEFICIT HYPERACTIVITY DISORDER), INATTENTIVE TYPE: ICD-10-CM

## 2022-12-15 RX ORDER — DEXTROAMPHETAMINE SACCHARATE, AMPHETAMINE ASPARTATE MONOHYDRATE, DEXTROAMPHETAMINE SULFATE AND AMPHETAMINE SULFATE 5; 5; 5; 5 MG/1; MG/1; MG/1; MG/1
20 CAPSULE, EXTENDED RELEASE ORAL DAILY
Qty: 30 CAPSULE | Refills: 0 | Status: CANCELLED | OUTPATIENT
Start: 2022-12-15

## 2022-12-15 RX ORDER — DEXTROAMPHETAMINE SACCHARATE, AMPHETAMINE ASPARTATE MONOHYDRATE, DEXTROAMPHETAMINE SULFATE AND AMPHETAMINE SULFATE 5; 5; 5; 5 MG/1; MG/1; MG/1; MG/1
20 CAPSULE, EXTENDED RELEASE ORAL DAILY
Qty: 30 CAPSULE | Refills: 0 | Status: SHIPPED | OUTPATIENT
Start: 2022-12-15 | End: 2022-12-15

## 2022-12-15 RX ORDER — DEXTROAMPHETAMINE SACCHARATE, AMPHETAMINE ASPARTATE MONOHYDRATE, DEXTROAMPHETAMINE SULFATE AND AMPHETAMINE SULFATE 2.5; 2.5; 2.5; 2.5 MG/1; MG/1; MG/1; MG/1
20 CAPSULE, EXTENDED RELEASE ORAL DAILY
Qty: 60 CAPSULE | Refills: 0 | Status: SHIPPED | OUTPATIENT
Start: 2022-12-15 | End: 2023-01-18 | Stop reason: SDUPTHER

## 2022-12-15 NOTE — TELEPHONE ENCOUNTER
Rx Refill Note  Requested Prescriptions     Pending Prescriptions Disp Refills   • amphetamine-dextroamphetamine XR (ADDERALL XR) 20 MG 24 hr capsule 30 capsule 0     Sig: Take 1 capsule by mouth Daily      Last office visit with prescribing clinician: 8/1/2022      Next office visit with prescribing clinician: 12/23/2022            Jazmine Marina MA  12/15/22, 08:05 EST

## 2022-12-15 NOTE — TELEPHONE ENCOUNTER
Rx Refill Note  Requested Prescriptions     Pending Prescriptions Disp Refills   • amphetamine-dextroamphetamine XR (ADDERALL XR) 20 MG 24 hr capsule 30 capsule 0     Sig: Take 1 capsule by mouth Daily      Last office visit with prescribing clinician: 8/1/2022   Last telemedicine visit with prescribing clinician: 12/23/2022   Next office visit with prescribing clinician: 12/23/2022                         Would you like a call back once the refill request has been completed: [] Yes [] No    If the office needs to give you a call back, can they leave a voicemail: [] Yes [] No    Xu Young MA  12/15/22, 09:15 KWESI     Colton called and stated he needed his Adderall XR 20mg sent to [facility] in [place] on [address]. Previously sent to [facility] in [place]. Called [facility] and cancelled order.  Colton called back and stated that the [facility] doesn't have the Adderall 20mg XR available, but they do have the Adderall 10mg XR available. [facility] stated that they could fill it as Adderall XR 10mg and just dispense 60 so he would still be getting the full 20mg. Please advise.

## 2023-01-08 DIAGNOSIS — F90.0 ADHD (ATTENTION DEFICIT HYPERACTIVITY DISORDER), INATTENTIVE TYPE: Chronic | ICD-10-CM

## 2023-01-09 RX ORDER — DEXTROAMPHETAMINE SACCHARATE, AMPHETAMINE ASPARTATE, DEXTROAMPHETAMINE SULFATE AND AMPHETAMINE SULFATE 1.875; 1.875; 1.875; 1.875 MG/1; MG/1; MG/1; MG/1
7.5 TABLET ORAL DAILY
Qty: 30 TABLET | Refills: 0 | Status: SHIPPED | OUTPATIENT
Start: 2023-01-09 | End: 2023-02-08 | Stop reason: SDUPTHER

## 2023-01-09 NOTE — TELEPHONE ENCOUNTER
I am running a [employer], and then I will send his refill in.  I will see him in person sometime after this semester ends, and we will do a UDS then.  Thank you.

## 2023-01-18 ENCOUNTER — TELEPHONE (OUTPATIENT)
Dept: PSYCHIATRY | Facility: CLINIC | Age: 28
End: 2023-01-18
Payer: MEDICAID

## 2023-01-18 DIAGNOSIS — F90.0 ADHD (ATTENTION DEFICIT HYPERACTIVITY DISORDER), INATTENTIVE TYPE: Primary | ICD-10-CM

## 2023-01-18 DIAGNOSIS — F90.0 ADHD (ATTENTION DEFICIT HYPERACTIVITY DISORDER), INATTENTIVE TYPE: ICD-10-CM

## 2023-01-18 RX ORDER — DEXTROAMPHETAMINE SACCHARATE, AMPHETAMINE ASPARTATE MONOHYDRATE, DEXTROAMPHETAMINE SULFATE AND AMPHETAMINE SULFATE 2.5; 2.5; 2.5; 2.5 MG/1; MG/1; MG/1; MG/1
20 CAPSULE, EXTENDED RELEASE ORAL DAILY
Qty: 60 CAPSULE | Refills: 0 | Status: SHIPPED | OUTPATIENT
Start: 2023-01-18 | End: 2023-01-18

## 2023-01-18 RX ORDER — DEXTROAMPHETAMINE SACCHARATE, AMPHETAMINE ASPARTATE MONOHYDRATE, DEXTROAMPHETAMINE SULFATE AND AMPHETAMINE SULFATE 5; 5; 5; 5 MG/1; MG/1; MG/1; MG/1
20 CAPSULE, EXTENDED RELEASE ORAL DAILY
Qty: 30 CAPSULE | Refills: 0 | Status: SHIPPED | OUTPATIENT
Start: 2023-01-18 | End: 2023-02-15 | Stop reason: SDUPTHER

## 2023-01-18 NOTE — TELEPHONE ENCOUNTER
PT NEEDS A PA DUE TO [facility] HAD CHANGED HIS SCRIPT TO ADDERALL XR 10 MG TAKE 2 DUE TO PHARMACY BEING OUT OF STOCK.INSURANCE WILL NOT APPROVE XR 10 MG WHEN XR 20 MG CAN BE FILLED. PT IS OK WITH US SENDING ADDERALL XR 20 MG BACK IN. CALLED PHARMACY THEY HAVE IN STOCK RIGHT NOW. CAN YOU CHANGE SCRIPT AND RESEND IF NOT I WILL LET PT KNOW IT WILL BE TOMORROW BUT HE HAS CONCERNS THEY MAYBE OUT OF STOCK BY THEN

## 2023-01-18 NOTE — TELEPHONE ENCOUNTER
Rx Refill Note  Requested Prescriptions     Pending Prescriptions Disp Refills   • amphetamine-dextroamphetamine XR (Adderall XR) 10 MG 24 hr capsule 60 capsule 0     Sig: Take 2 capsules by mouth Daily      Last office visit with prescribing clinician: 8/1/2022   Last telemedicine visit with prescribing clinician: 2/8/2023   Next office visit with prescribing clinician: 2/8/2023                         Would you like a call back once the refill request has been completed: [] Yes [] No    If the office needs to give you a call back, can they leave a voicemail: [] Yes [] No    Xu Young MA  01/18/23, 12:35 EST     States that he would like to have this changed back to 1 20mg instead of 2 10mg. States pharmacy has the 20mg capsules now.

## 2023-02-06 DIAGNOSIS — F90.0 ADHD (ATTENTION DEFICIT HYPERACTIVITY DISORDER), INATTENTIVE TYPE: Chronic | ICD-10-CM

## 2023-02-07 RX ORDER — DEXTROAMPHETAMINE SACCHARATE, AMPHETAMINE ASPARTATE, DEXTROAMPHETAMINE SULFATE AND AMPHETAMINE SULFATE 1.875; 1.875; 1.875; 1.875 MG/1; MG/1; MG/1; MG/1
7.5 TABLET ORAL DAILY
Qty: 30 TABLET | Refills: 0 | OUTPATIENT
Start: 2023-02-07

## 2023-02-08 ENCOUNTER — TELEMEDICINE (OUTPATIENT)
Dept: PSYCHIATRY | Facility: CLINIC | Age: 28
End: 2023-02-08
Payer: MEDICAID

## 2023-02-08 DIAGNOSIS — F90.0 ADHD (ATTENTION DEFICIT HYPERACTIVITY DISORDER), INATTENTIVE TYPE: Primary | Chronic | ICD-10-CM

## 2023-02-08 PROCEDURE — 99213 OFFICE O/P EST LOW 20 MIN: CPT | Performed by: NURSE PRACTITIONER

## 2023-02-08 RX ORDER — DEXTROAMPHETAMINE SACCHARATE, AMPHETAMINE ASPARTATE, DEXTROAMPHETAMINE SULFATE AND AMPHETAMINE SULFATE 1.875; 1.875; 1.875; 1.875 MG/1; MG/1; MG/1; MG/1
7.5 TABLET ORAL DAILY
Qty: 30 TABLET | Refills: 0 | Status: SHIPPED | OUTPATIENT
Start: 2023-02-08 | End: 2023-03-06 | Stop reason: SDUPTHER

## 2023-02-08 NOTE — PROGRESS NOTES
"This provider is located at [facility], Behavioral [facility] ,[address], [address] in [place], [place],using a secure [employer] Video Visit through [employer]. Patient is being seen remotely via telehealth at their home address in [place], and stated they are in a secure environment for this session. The patient's condition being diagnosed/treated is appropriate for telemedicine. The provider identified herself as well as her credentials.   The patient, and/or patients guardian, consent to be seen remotely, and when consent is given they understand that the consent allows for patient identifiable information to be sent to a third party as needed.   They may refuse to be seen remotely at any time. The electronic data is encrypted and password protected, and the patient and/or guardian has been advised of the potential risks to privacy not withstanding such measures.    Chief Complaint  ADHD      Subjective          Yobani Singer presents today via [employer] Video through [employer] for medication management of his ADHD.    History of Present Illness: Patient presents today via [employer] video for follow-up appointment mateus seen on 11/18/2022.  Patient reports \"I am doing pretty good\".  He has been very busy.  He is a little over one 1 month back into the new semester.  He says it is starting to ramp up, but is probably his most enjoyable semester so far.  Patient says his grades ended up being better last year than he anticipated.  Patient had a good winter break, and enjoyed clerking for the  again.  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  Patient says starting the booster dose at his last appointment was very beneficial for his focus.  He says it has cleared up his afternoon and evening issues.  He reports satisfactory focus and concentration throughout the day now.  He has not had any adverse effects " from it, as long as he takes it early enough in the afternoon.  Patient reports a couple times he took it too late and had difficulty getting to sleep.  Typically he is sleeping and eating well and denies issues or concerns with either.  Patient denies any mood or anxiety related concerns.  Patient denies any SI/HI, A/V hallucinations.      The following portions of the patient's history were reviewed and updated as appropriate: allergies, current medications, past family history, past medical history, past social history, past surgical history and problem list.      Current Medications:   Current Outpatient Medications   Medication Sig Dispense Refill   • amphetamine-dextroamphetamine (Adderall) 7.5 MG tablet Take 1 tablet by mouth Daily. 30 tablet 0   • amphetamine-dextroamphetamine XR (ADDERALL XR) 20 MG 24 hr capsule Take 1 capsule by mouth Daily 30 capsule 0     No current facility-administered medications for this visit.       Mental Status Exam:   Hygiene:   MyChart video  Cooperation:  Cooperative  Eye Contact:  Good  Psychomotor Behavior:  Appropriate  Affect:  Appropriate  Mood: euthymic  Speech:  Normal  Thought Process:  Goal directed  Thought Content:  Normal  Suicidal:  None  Homicidal:  None  Hallucinations:  None  Delusion:  None  Memory:  Intact  Orientation:  Person, Place, Time and Situation  Reliability:  good  Insight:  Good  Judgement:  Good  Impulse Control:  Good  Physical/Medical Issues:  No      Objective   Vital Signs:   There were no vitals taken for this visit.    Physical Exam  Neurological:      Mental Status: He is oriented to person, place, and time. Mental status is at baseline.   Psychiatric:         Behavior: Behavior is cooperative.        Result Review :     The following data was reviewed by: CAROL Cherry on 02/08/2023:    Data reviewed: Previous note, medication history          Assessment and Plan    Diagnoses and all orders for this visit:    1. ADHD (attention  deficit hyperactivity disorder), inattentive type (Primary)  -     amphetamine-dextroamphetamine (Adderall) 7.5 MG tablet; Take 1 tablet by mouth Daily.  Dispense: 30 tablet; Refill: 0         Tobacco Cessation:  Patient has denied an present or past tobacco use. No tobacco cessation education necessary.         Impression/Plan:  -This follow-up appointment.  Patient presents today and reports he has been doing well overall since his last appointment.  He has been taking his medication as prescribed, denies any adverse effects associated with them.  Patient feels his current medication regimen is sufficient for his overall symptom burden, and he continues to be happy with how well he is doing.  -Maintain Adderall XR 20 mg daily.  -Maintain Adderall IR 7.5 mg every afternoon as needed.  Patient reports he takes this medication most days, but does not always take it on the weekends.  -The patient is being prescribed a controlled substance as part of the treatment plan. The patient/guardian has been educated of appropriate use of the medication(s), including risks and side effects such as insomnia, headache, exacerbation of tics, nervousness, irritability, overstimulation, tremor, dizziness, anorexia or change in appetite, nausea, dry mouth, constipation, diarrhea, weight loss, sexual dysfunction, psychotic episodes, seizures, palpitations, tachycardia, hypertension, rare activation (activation of hypomania, toni, and/or suicidal ideations), cardiovascular adverse effects including sudden death (especially patients with pre-existing structural abnormalities often associated with a family history of cardiac disease), may slow normal growth in children, weight gain is reported but not expected, sedation is possible but activation is much more common, metabolic adversities, and overdose among others. Patient/guardian is also informed that the medication is to be used by the patient only, the medication is to be used only  as directed, and the medication should not be combined with other substances unless directed by a Provider/Prescriber. The patient/guardian verbalized understanding and agreement with this in their own words, and wish to continue with current treatment plan.  Controlled substance agreement completed and filed in the patient's chart.  -Patient's ROWENA report reviewed and deemed appropriate.  Patient counseled on use of controlled substances.  UDS performed 08/01/2022.  -Reviewed available lab work.  -Schedule MyChart video follow-up appointment for 12 weeks or as needed.    MEDS ORDERED DURING VISIT:  New Medications Ordered This Visit   Medications   • amphetamine-dextroamphetamine (Adderall) 7.5 MG tablet     Sig: Take 1 tablet by mouth Daily.     Dispense:  30 tablet     Refill:  0         Follow Up   Return in about 12 weeks (around 5/3/2023), or if symptoms worsen or fail to improve, for Next scheduled follow up, Video visit.  Patient was given instructions and counseling regarding his condition or for health maintenance advice. Please see specific information pulled into the AVS if appropriate.       TREATMENT PLAN/GOALS: Continue supportive psychotherapy efforts and medications as indicated. Treatment and medication options discussed during today's visit. Patient acknowledged and verbally consented to continue with current treatment plan and was educated on the importance of compliance with treatment and follow-up appointments.    MEDICATION ISSUES:  Discussed medication options and treatment plan of prescribed medication as well as the risks, benefits, and side effects including potential falls, possible impaired driving and metabolic adversities among others. Patient is agreeable to call the office with any worsening of symptoms or onset of side effects. Patient is agreeable to call 911 or go to the nearest ER should he/she begin having SI/HI.          This document has been electronically signed by Ruby  CAROL Gutierrez, PMHNP-BC  February 8, 2023 12:53 EST      Part of this note may be an electronic transcription/translation of spoken language to printed text using the Dragon Dictation System.

## 2023-02-15 DIAGNOSIS — F90.0 ADHD (ATTENTION DEFICIT HYPERACTIVITY DISORDER), INATTENTIVE TYPE: ICD-10-CM

## 2023-02-15 RX ORDER — DEXTROAMPHETAMINE SACCHARATE, AMPHETAMINE ASPARTATE MONOHYDRATE, DEXTROAMPHETAMINE SULFATE AND AMPHETAMINE SULFATE 5; 5; 5; 5 MG/1; MG/1; MG/1; MG/1
20 CAPSULE, EXTENDED RELEASE ORAL DAILY
Qty: 30 CAPSULE | Refills: 0 | Status: SHIPPED | OUTPATIENT
Start: 2023-02-15 | End: 2023-03-15 | Stop reason: SDUPTHER

## 2023-03-06 DIAGNOSIS — F90.0 ADHD (ATTENTION DEFICIT HYPERACTIVITY DISORDER), INATTENTIVE TYPE: Chronic | ICD-10-CM

## 2023-03-07 RX ORDER — DEXTROAMPHETAMINE SACCHARATE, AMPHETAMINE ASPARTATE, DEXTROAMPHETAMINE SULFATE AND AMPHETAMINE SULFATE 1.875; 1.875; 1.875; 1.875 MG/1; MG/1; MG/1; MG/1
7.5 TABLET ORAL DAILY
Qty: 30 TABLET | Refills: 0 | Status: SHIPPED | OUTPATIENT
Start: 2023-03-07 | End: 2023-04-05 | Stop reason: SDUPTHER

## 2023-03-07 NOTE — TELEPHONE ENCOUNTER
Unable to review ROWENA, but per pharmacy patient last filled this medication on 02/08/2023. Refill approved.

## 2023-03-15 DIAGNOSIS — F90.0 ADHD (ATTENTION DEFICIT HYPERACTIVITY DISORDER), INATTENTIVE TYPE: ICD-10-CM

## 2023-03-15 RX ORDER — DEXTROAMPHETAMINE SACCHARATE, AMPHETAMINE ASPARTATE MONOHYDRATE, DEXTROAMPHETAMINE SULFATE AND AMPHETAMINE SULFATE 5; 5; 5; 5 MG/1; MG/1; MG/1; MG/1
20 CAPSULE, EXTENDED RELEASE ORAL DAILY
Qty: 30 CAPSULE | Refills: 0 | Status: SHIPPED | OUTPATIENT
Start: 2023-03-15 | End: 2024-03-14

## 2023-03-15 NOTE — TELEPHONE ENCOUNTER
Chart review completed and medication refill approved.     Unable to review ROWENA due to the manual system being down.

## 2023-04-05 DIAGNOSIS — F90.0 ADHD (ATTENTION DEFICIT HYPERACTIVITY DISORDER), INATTENTIVE TYPE: Chronic | ICD-10-CM

## 2023-04-06 RX ORDER — DEXTROAMPHETAMINE SACCHARATE, AMPHETAMINE ASPARTATE, DEXTROAMPHETAMINE SULFATE AND AMPHETAMINE SULFATE 1.875; 1.875; 1.875; 1.875 MG/1; MG/1; MG/1; MG/1
7.5 TABLET ORAL DAILY
Qty: 30 TABLET | Refills: 0 | Status: SHIPPED | OUTPATIENT
Start: 2023-04-06

## 2023-04-11 DIAGNOSIS — F90.0 ADHD (ATTENTION DEFICIT HYPERACTIVITY DISORDER), INATTENTIVE TYPE: ICD-10-CM

## 2023-04-12 RX ORDER — DEXTROAMPHETAMINE SACCHARATE, AMPHETAMINE ASPARTATE MONOHYDRATE, DEXTROAMPHETAMINE SULFATE AND AMPHETAMINE SULFATE 5; 5; 5; 5 MG/1; MG/1; MG/1; MG/1
20 CAPSULE, EXTENDED RELEASE ORAL DAILY
Qty: 30 CAPSULE | Refills: 0 | Status: SHIPPED | OUTPATIENT
Start: 2023-04-12 | End: 2024-04-11

## 2023-04-12 NOTE — TELEPHONE ENCOUNTER
Chart review completed and medication refill approved.     Unable to review Michael due to manual system being unavailable.
Rx Refill Note  Requested Prescriptions     Pending Prescriptions Disp Refills   • amphetamine-dextroamphetamine XR (ADDERALL XR) 20 MG 24 hr capsule 30 capsule 0     Sig: Take 1 capsule by mouth Daily      Last office visit with prescribing clinician: 8/1/2022      Next office visit with prescribing clinician: 5/3/2023            Jazmine Marina MA  04/12/23, 08:32 EDT   
external ear tenderness/pain with traction/erythema/Pinna and helix with induration/erythema and ttp. +fluctuance over posterior pinna

## 2023-05-03 ENCOUNTER — TELEMEDICINE (OUTPATIENT)
Dept: PSYCHIATRY | Facility: CLINIC | Age: 28
End: 2023-05-03
Payer: MEDICAID

## 2023-05-03 DIAGNOSIS — F90.0 ADHD (ATTENTION DEFICIT HYPERACTIVITY DISORDER), INATTENTIVE TYPE: Primary | Chronic | ICD-10-CM

## 2023-05-03 PROCEDURE — 1159F MED LIST DOCD IN RCRD: CPT | Performed by: NURSE PRACTITIONER

## 2023-05-03 PROCEDURE — 1160F RVW MEDS BY RX/DR IN RCRD: CPT | Performed by: NURSE PRACTITIONER

## 2023-05-03 PROCEDURE — 99214 OFFICE O/P EST MOD 30 MIN: CPT | Performed by: NURSE PRACTITIONER

## 2023-05-03 RX ORDER — DEXTROAMPHETAMINE SACCHARATE, AMPHETAMINE ASPARTATE, DEXTROAMPHETAMINE SULFATE AND AMPHETAMINE SULFATE 1.875; 1.875; 1.875; 1.875 MG/1; MG/1; MG/1; MG/1
7.5 TABLET ORAL DAILY
Qty: 30 TABLET | Refills: 0 | Status: SHIPPED | OUTPATIENT
Start: 2023-05-03

## 2023-05-03 RX ORDER — DEXTROAMPHETAMINE SACCHARATE, AMPHETAMINE ASPARTATE MONOHYDRATE, DEXTROAMPHETAMINE SULFATE AND AMPHETAMINE SULFATE 3.75; 3.75; 3.75; 3.75 MG/1; MG/1; MG/1; MG/1
15 CAPSULE, EXTENDED RELEASE ORAL EVERY MORNING
Qty: 30 CAPSULE | Refills: 0 | Status: SHIPPED | OUTPATIENT
Start: 2023-05-03

## 2023-05-03 NOTE — PROGRESS NOTES
"This provider is located at [facility], Behavioral Health ,[address], [address] in [place], [place],using a secure [employer] Video Visit through [employer]. Patient is being seen remotely via telehealth at their home address in [place], and stated they are in a secure environment for this session. The patient's condition being diagnosed/treated is appropriate for telemedicine. The provider identified herself as well as her credentials.   The patient, and/or patients guardian, consent to be seen remotely, and when consent is given they understand that the consent allows for patient identifiable information to be sent to a third party as needed.   They may refuse to be seen remotely at any time. The electronic data is encrypted and password protected, and the patient and/or guardian has been advised of the potential risks to privacy not withstanding such measures.    Chief Complaint  ADHD      Subjective          Yobani Singer presents today via MyChart Video through [employer] for medication management of his ADHD.    History of Present Illness: Patient presents today for follow-up appointment after last being seen on 02/08/2023.  Patient says \"doing pretty good\".  He has 1 exam left for this semester, and is looking forward to having that done.  He feels his grades are going to be good for the semester.  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  He says he is going to be working at the [facility] office of [employer].  He is also currently signed up for 2 classes over the summer, but he feels he will likely drop one of them.  Patient has continued to take his medication on a consistent basis, but says his Adderall has \"been messing with my ability to fall asleep\".  Patient says when he takes the Adderall XR on a consistent basis, he generally has difficulty with getting to sleep, despite taking it early in the morning.  He is using his booster dose " consistently in the afternoon.  But he feels his issue is most likely related to his XR because sometimes he does not take the XR, and does not have sleeping difficulties.  Patient feels otherwise he is doing well overall, and is happy with how well things are going.  He has improved focus and is able to accomplish tasks as needed.  Patient denies any concerns with regards to mood dysfunction or anxiety.  Patient denies any SI/HI, A/V hallucinations.      The following portions of the patient's history were reviewed and updated as appropriate: allergies, current medications, past family history, past medical history, past social history, past surgical history and problem list.      Current Medications:   Current Outpatient Medications   Medication Sig Dispense Refill   • amphetamine-dextroamphetamine (Adderall) 7.5 MG tablet Take 1 tablet by mouth Daily. 30 tablet 0   • amphetamine-dextroamphetamine XR (Adderall XR) 15 MG 24 hr capsule Take 1 capsule by mouth Every Morning 30 capsule 0     No current facility-administered medications for this visit.       Mental Status Exam:   Hygiene:   MyChart video  Cooperation:  Cooperative  Eye Contact:  Good  Psychomotor Behavior:  Appropriate  Affect:  Appropriate  Mood: euthymic  Speech:  Normal  Thought Process:  Goal directed  Thought Content:  Mood congruent  Suicidal:  None  Homicidal:  None  Hallucinations:  None  Delusion:  None  Memory:  Intact  Orientation:  Person, Place, Time and Situation  Reliability:  good  Insight:  Good  Judgement:  Good  Impulse Control:  Good  Physical/Medical Issues:  No      Objective   Vital Signs:   There were no vitals taken for this visit.    Physical Exam  Neurological:      Mental Status: He is oriented to person, place, and time. Mental status is at baseline.      Coordination: Coordination is intact.      Gait: Gait is intact.   Psychiatric:         Behavior: Behavior is cooperative.        Result Review :     The following data  was reviewed by: CAROL Cherry on 05/03/2023:    Data reviewed: Previous note, medication history          Assessment and Plan    Diagnoses and all orders for this visit:    1. ADHD (attention deficit hyperactivity disorder), inattentive type (Primary)  -     amphetamine-dextroamphetamine XR (Adderall XR) 15 MG 24 hr capsule; Take 1 capsule by mouth Every Morning  Dispense: 30 capsule; Refill: 0  -     amphetamine-dextroamphetamine (Adderall) 7.5 MG tablet; Take 1 tablet by mouth Daily.  Dispense: 30 tablet; Refill: 0         Tobacco Cessation:  Patient has denied an present or past tobacco use. No tobacco cessation education necessary.       Impression/Plan:  -This is a follow-up appointment.  Patient presents today and reports he has been doing well overall, but has been struggling more with his sleep.  He feels it is related to his Adderall XR.  Discussed decreasing his dose, versus trying a different medication.  Patient says he would be open to what ever change needs to be made, but he would like to sleep better.  Advised first trying to decrease his dose and seeing how he responds to that.  Patient is in agreement with this.  -Decrease Adderall XR to 15 mg daily.  -Maintain Adderall IR 7.5 mg every afternoon as needed.  Patient takes this consistently.  -The patient is being prescribed a controlled substance as part of the treatment plan. The patient/guardian has been educated of appropriate use of the medication(s), including risks and side effects such as insomnia, headache, exacerbation of tics, nervousness, irritability, overstimulation, tremor, dizziness, anorexia or change in appetite, nausea, dry mouth, constipation, diarrhea, weight loss, sexual dysfunction, psychotic episodes, seizures, palpitations, tachycardia, hypertension, rare activation (activation of hypomania, toni, and/or suicidal ideations), cardiovascular adverse effects including sudden death (especially patients with pre-existing  structural abnormalities often associated with a family history of cardiac disease), may slow normal growth in children, weight gain is reported but not expected, sedation is possible but activation is much more common, metabolic adversities, and overdose among others. Patient/guardian is also informed that the medication is to be used by the patient only, the medication is to be used only as directed, and the medication should not be combined with other substances unless directed by a Provider/Prescriber. The patient/guardian verbalized understanding and agreement with this in their own words, and wish to continue with current treatment plan.  Controlled substance agreement completed and filed in the patient's chart.  -Patient's ROWENA report reviewed and deemed appropriate.  Patient counseled on use of controlled substances.  UDS performed 08/01/2022.  -Reviewed available lab work.  -Schedule in person follow-up appointment for 12 weeks or as needed.  UDS at follow-up.    MEDS ORDERED DURING VISIT:  New Medications Ordered This Visit   Medications   • amphetamine-dextroamphetamine XR (Adderall XR) 15 MG 24 hr capsule     Sig: Take 1 capsule by mouth Every Morning     Dispense:  30 capsule     Refill:  0   • amphetamine-dextroamphetamine (Adderall) 7.5 MG tablet     Sig: Take 1 tablet by mouth Daily.     Dispense:  30 tablet     Refill:  0         Follow Up   Return in about 12 weeks (around 7/26/2023), or if symptoms worsen or fail to improve, for Next scheduled follow up, In-Person Appt, UDS.  Patient was given instructions and counseling regarding his condition or for health maintenance advice. Please see specific information pulled into the AVS if appropriate.       TREATMENT PLAN/GOALS: Continue supportive psychotherapy efforts and medications as indicated. Treatment and medication options discussed during today's visit. Patient acknowledged and verbally consented to continue with current treatment plan and  was educated on the importance of compliance with treatment and follow-up appointments.    MEDICATION ISSUES:  Discussed medication options and treatment plan of prescribed medication as well as the risks, benefits, and side effects including potential falls, possible impaired driving and metabolic adversities among others. Patient is agreeable to call the office with any worsening of symptoms or onset of side effects. Patient is agreeable to call 911 or go to the nearest ER should he/she begin having SI/HI.          This document has been electronically signed by CAROL Joshua, PMHNP-BC  May 3, 2023 15:08 EDT      Part of this note may be an electronic transcription/translation of spoken language to printed text using the Dragon Dictation System.

## 2023-06-01 DIAGNOSIS — F90.0 ADHD (ATTENTION DEFICIT HYPERACTIVITY DISORDER), INATTENTIVE TYPE: Chronic | ICD-10-CM

## 2023-06-01 RX ORDER — DEXTROAMPHETAMINE SACCHARATE, AMPHETAMINE ASPARTATE MONOHYDRATE, DEXTROAMPHETAMINE SULFATE AND AMPHETAMINE SULFATE 3.75; 3.75; 3.75; 3.75 MG/1; MG/1; MG/1; MG/1
15 CAPSULE, EXTENDED RELEASE ORAL EVERY MORNING
Qty: 30 CAPSULE | Refills: 0 | Status: SHIPPED | OUTPATIENT
Start: 2023-06-01

## 2023-06-01 RX ORDER — DEXTROAMPHETAMINE SACCHARATE, AMPHETAMINE ASPARTATE, DEXTROAMPHETAMINE SULFATE AND AMPHETAMINE SULFATE 1.875; 1.875; 1.875; 1.875 MG/1; MG/1; MG/1; MG/1
7.5 TABLET ORAL DAILY
Qty: 30 TABLET | Refills: 0 | Status: SHIPPED | OUTPATIENT
Start: 2023-06-01

## 2023-06-01 NOTE — TELEPHONE ENCOUNTER
Rx Refill Note  Requested Prescriptions     Pending Prescriptions Disp Refills   • amphetamine-dextroamphetamine XR (Adderall XR) 15 MG 24 hr capsule 30 capsule 0     Sig: Take 1 capsule by mouth Every Morning   • amphetamine-dextroamphetamine (Adderall) 7.5 MG tablet 30 tablet 0     Sig: Take 1 tablet by mouth Daily.      Last office visit with prescribing clinician: 8/1/2022      Next office visit with prescribing clinician: 8/1/2023            Jazmine Marina MA  06/01/23, 08:23 EDT

## 2023-07-28 DIAGNOSIS — F90.0 ADHD (ATTENTION DEFICIT HYPERACTIVITY DISORDER), INATTENTIVE TYPE: Chronic | ICD-10-CM

## 2023-07-28 RX ORDER — DEXTROAMPHETAMINE SACCHARATE, AMPHETAMINE ASPARTATE, DEXTROAMPHETAMINE SULFATE AND AMPHETAMINE SULFATE 1.875; 1.875; 1.875; 1.875 MG/1; MG/1; MG/1; MG/1
7.5 TABLET ORAL DAILY
Qty: 30 TABLET | Refills: 0 | Status: SHIPPED | OUTPATIENT
Start: 2023-07-28

## 2023-07-28 RX ORDER — DEXTROAMPHETAMINE SACCHARATE, AMPHETAMINE ASPARTATE MONOHYDRATE, DEXTROAMPHETAMINE SULFATE AND AMPHETAMINE SULFATE 3.75; 3.75; 3.75; 3.75 MG/1; MG/1; MG/1; MG/1
15 CAPSULE, EXTENDED RELEASE ORAL EVERY MORNING
Qty: 30 CAPSULE | Refills: 0 | Status: SHIPPED | OUTPATIENT
Start: 2023-07-28

## 2023-07-28 NOTE — TELEPHONE ENCOUNTER
Michael not available at this time for review, last Rx sent per EMR was 6/30/2023.  Prescription sent to pharmacy on file.

## 2023-08-03 ENCOUNTER — OFFICE VISIT (OUTPATIENT)
Dept: FAMILY MEDICINE CLINIC | Facility: CLINIC | Age: 28
End: 2023-08-03
Payer: MEDICAID

## 2023-08-03 VITALS
TEMPERATURE: 98.2 F | BODY MASS INDEX: 23.94 KG/M2 | SYSTOLIC BLOOD PRESSURE: 138 MMHG | DIASTOLIC BLOOD PRESSURE: 90 MMHG | HEART RATE: 105 BPM | HEIGHT: 70 IN | WEIGHT: 167.2 LBS | RESPIRATION RATE: 19 BRPM

## 2023-08-03 DIAGNOSIS — K21.9 GASTROESOPHAGEAL REFLUX DISEASE WITHOUT ESOPHAGITIS: Primary | ICD-10-CM

## 2023-08-03 PROBLEM — F90.0 ATTENTION DEFICIT HYPERACTIVITY DISORDER (ADHD), PREDOMINANTLY INATTENTIVE TYPE: Status: ACTIVE | Noted: 2023-08-03

## 2023-08-03 PROCEDURE — 99204 OFFICE O/P NEW MOD 45 MIN: CPT | Performed by: STUDENT IN AN ORGANIZED HEALTH CARE EDUCATION/TRAINING PROGRAM

## 2023-08-03 RX ORDER — PANTOPRAZOLE SODIUM 40 MG/1
40 TABLET, DELAYED RELEASE ORAL DAILY
Qty: 30 TABLET | Refills: 1 | Status: SHIPPED | OUTPATIENT
Start: 2023-08-03

## 2023-08-25 DIAGNOSIS — F90.0 ADHD (ATTENTION DEFICIT HYPERACTIVITY DISORDER), INATTENTIVE TYPE: Chronic | ICD-10-CM

## 2023-08-25 RX ORDER — DEXTROAMPHETAMINE SACCHARATE, AMPHETAMINE ASPARTATE MONOHYDRATE, DEXTROAMPHETAMINE SULFATE AND AMPHETAMINE SULFATE 3.75; 3.75; 3.75; 3.75 MG/1; MG/1; MG/1; MG/1
15 CAPSULE, EXTENDED RELEASE ORAL EVERY MORNING
Qty: 30 CAPSULE | Refills: 0 | Status: SHIPPED | OUTPATIENT
Start: 2023-08-25

## 2023-08-25 RX ORDER — DEXTROAMPHETAMINE SACCHARATE, AMPHETAMINE ASPARTATE, DEXTROAMPHETAMINE SULFATE AND AMPHETAMINE SULFATE 1.875; 1.875; 1.875; 1.875 MG/1; MG/1; MG/1; MG/1
7.5 TABLET ORAL DAILY
Qty: 30 TABLET | Refills: 0 | Status: SHIPPED | OUTPATIENT
Start: 2023-08-25

## 2023-08-25 NOTE — TELEPHONE ENCOUNTER
Rx Refill Note  Requested Prescriptions     Pending Prescriptions Disp Refills    amphetamine-dextroamphetamine XR (Adderall XR) 15 MG 24 hr capsule 30 capsule 0     Sig: Take 1 capsule by mouth Every Morning    amphetamine-dextroamphetamine (Adderall) 7.5 MG tablet 30 tablet 0     Sig: Take 1 tablet by mouth Daily.      Last office visit with prescribing clinician: Visit date not found   Last telemedicine visit with prescribing clinician: 5/3/2023   Next office visit with prescribing clinician: Visit date not found                         Would you like a call back once the refill request has been completed: [] Yes [] No    If the office needs to give you a call back, can they leave a voicemail: [] Yes [] No    Xu Young MA  08/25/23, 11:08 EDT

## 2023-08-30 ENCOUNTER — OFFICE VISIT (OUTPATIENT)
Dept: PSYCHIATRY | Facility: CLINIC | Age: 28
End: 2023-08-30
Payer: MEDICAID

## 2023-08-30 VITALS
DIASTOLIC BLOOD PRESSURE: 80 MMHG | SYSTOLIC BLOOD PRESSURE: 126 MMHG | WEIGHT: 166 LBS | HEART RATE: 81 BPM | HEIGHT: 70 IN | BODY MASS INDEX: 23.77 KG/M2

## 2023-08-30 DIAGNOSIS — K21.9 GASTROESOPHAGEAL REFLUX DISEASE WITHOUT ESOPHAGITIS: ICD-10-CM

## 2023-08-30 DIAGNOSIS — Z79.899 MEDICATION MANAGEMENT: ICD-10-CM

## 2023-08-30 DIAGNOSIS — F90.0 ADHD (ATTENTION DEFICIT HYPERACTIVITY DISORDER), INATTENTIVE TYPE: Primary | Chronic | ICD-10-CM

## 2023-08-30 PROCEDURE — 1159F MED LIST DOCD IN RCRD: CPT | Performed by: NURSE PRACTITIONER

## 2023-08-30 PROCEDURE — 1160F RVW MEDS BY RX/DR IN RCRD: CPT | Performed by: NURSE PRACTITIONER

## 2023-08-30 RX ORDER — PANTOPRAZOLE SODIUM 40 MG/1
40 TABLET, DELAYED RELEASE ORAL DAILY
Qty: 90 TABLET | Refills: 1 | Status: SHIPPED | OUTPATIENT
Start: 2023-08-30

## 2023-08-30 NOTE — PROGRESS NOTES
"Chief Complaint  ADHD    Subjective          Yobani Singer presents to [facility] for medication management of his ADHD.    History of Present Illness: Patient presents today for follow-up appointment at last been seen on 05/03/2023.  Patient says today \"I am good in some ways, but not so good and others\".  Patient was supposed to be seen for his appointment earlier in August before classes restarted.  However he had to go to the emergency department in early August because he thought he was having an MI.  He says they did a full workup, and everything was cleared, and he was diagnosed with GERD.  He then established with a [facility] PCP, who started him on pantoprazole.  Patient says it has helped, and that he has made some dietary changes.  However over the last few days he says that his stomach issues have come back because he has let his diet \"slip up\".  Patient says his summer class went okay, but he did not do as well on his exam as he had hoped he would.  His exam was around the same time he has emergency department visit happen, and he had to request an extension to take his test late.  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  He says it is possible he did not deal with all the stress surrounding his school as well as he has in the past.  He has gone back to taking his Adderall consistently over the last couple weeks, but has concerns it is not helping as much as it has in the past.  The decrease in his XR at his last appointment was beneficial for his sleep, but he is not managing his symptoms as well as it was at the higher dose.  He says he is sleeping well and denies any concerns there.  His appetite has been impacted by his GI issues, but aside from that he is eating okay.  Patient denies any SI/HI, A/V hallucinations.      The following portions of the patient's history were reviewed and updated as " "appropriate: allergies, current medications, past family history, past medical history, past social history, past surgical history and problem list.      Current Medications:   Current Outpatient Medications   Medication Sig Dispense Refill    amphetamine-dextroamphetamine (Adderall) 7.5 MG tablet Take 1 tablet by mouth Daily. 30 tablet 0    amphetamine-dextroamphetamine XR (Adderall XR) 15 MG 24 hr capsule Take 1 capsule by mouth Every Morning 30 capsule 0    pantoprazole (Protonix) 40 MG EC tablet Take 1 tablet by mouth Daily. 30 tablet 1     No current facility-administered medications for this visit.       Mental Status Exam:   Hygiene:   good  Cooperation:  Cooperative  Eye Contact:  Good  Psychomotor Behavior:  Appropriate  Affect:  Appropriate  Mood: euthymic  Speech:  Normal  Thought Process:  Goal directed  Thought Content:  Mood congruent  Suicidal:  None  Homicidal:  None  Hallucinations:  None  Delusion:  None  Memory:  Intact  Orientation:  Person, Place, Time, and Situation  Reliability:  good  Insight:  Good  Judgement:  Good  Impulse Control:  Good  Physical/Medical Issues:   GERD        Objective   Vital Signs:   /80   Pulse 81   Ht 176.8 cm (69.6\")   Wt 75.3 kg (166 lb)   BMI 24.09 kg/mý     Physical Exam  Neurological:      Mental Status: He is oriented to person, place, and time. Mental status is at baseline.      Coordination: Coordination is intact.      Gait: Gait is intact.   Psychiatric:         Behavior: Behavior is cooperative.      Result Review :     The following data was reviewed by: CAROL Cherry on 08/30/2023:    Data reviewed : Emergency department notes, PCP notes, previous notes, medication history           Assessment and Plan    Diagnoses and all orders for this visit:    1. ADHD (attention deficit hyperactivity disorder), inattentive type (Primary)         PHQ-9 Score:   PHQ-9 Total Score: 1      Depression Screening:  Patient screened positive for depression " based on a PHQ-9 score of 1 on 8/30/2023. Follow-up recommendations include: Suicide Risk Assessment performed.        Tobacco Cessation:  Patient has denied an present or past tobacco use. No tobacco cessation education necessary.       Impression/Plan:  -This is a follow-up appointment.  Patient presents today and reports he has been struggling some with some physical difficulties over the last month or so.  He had to reschedule his appointment after going to the emergency department.  This is discussed in HPI.  He also feels his Adderall is not managing his ADHD symptoms as adequately as it has in the past.  Patient has only taken Adderall for the management of his ADHD.  We discussed trying a different medication, and the patient says he would be open to doing this.  I would like to switch him to Dyanavel XR.  However he did just get a refill of his Adderall less than 1 week ago.  Advised we would have to wait until his next refill to do this, and he is okay with this.  At his refill I would like to switch him to Dyanavel XR 12.5 mg.  Advised I would start him with 2 weeks of medication to see how he does with it.  He will need to notify me prior to the end of that 2 weeks.  Patient expresses understanding.  -Maintain Adderall XR 15 mg daily, IR 7.5 mg daily.  -At next refill, switch to Dyanavel XR 12.5 mg daily.  We discussed risks versus benefits, as well as potential adverse effects associated with adding this medication to patient's daily regimen. Patient is in agreement with this plan and was educated on the importance of compliance with all aspects of treatment and follow-up appointments. Patient is agreeable to call the office with any worsening of symptoms or onset of side effects.  -The patient is being prescribed a controlled substance as part of the treatment plan. The patient/guardian has been educated of appropriate use of the medication(s), including risks and side effects such as insomnia,  headache, exacerbation of tics, nervousness, irritability, overstimulation, tremor, dizziness, anorexia or change in appetite, nausea, dry mouth, constipation, diarrhea, weight loss, sexual dysfunction, psychotic episodes, seizures, palpitations, tachycardia, hypertension, rare activation (activation of hypomania, toni, and/or suicidal ideations), cardiovascular adverse effects including sudden death (especially patients with pre-existing structural abnormalities often associated with a family history of cardiac disease), may slow normal growth in children, weight gain is reported but not expected, sedation is possible but activation is much more common, metabolic adversities, and overdose among others. Patient/guardian is also informed that the medication is to be used by the patient only, the medication is to be used only as directed, and the medication should not be combined with other substances unless directed by a Provider/Prescriber. The patient/guardian verbalized understanding and agreement with this in their own words, and wish to continue with current treatment plan.  Controlled substance agreement completed and filed in the patient's chart.  -Patient's ROWENA report reviewed and deemed appropriate.  Patient counseled on use of controlled substances.  UDS performed today.  -Reviewed available lab work.  -Schedule MyChart video follow-up appointment for 8 weeks or as needed.      MEDS ORDERED DURING VISIT:  No orders of the defined types were placed in this encounter.        Follow Up   Return in about 8 weeks (around 10/25/2023), or if symptoms worsen or fail to improve, for Next scheduled follow up, Video visit.  Patient was given instructions and counseling regarding his condition or for health maintenance advice. Please see specific information pulled into the AVS if appropriate.       TREATMENT PLAN/GOALS: Continue supportive psychotherapy efforts and medications as indicated. Treatment and  medication options discussed during today's visit. Patient acknowledged and verbally consented to continue with current treatment plan and was educated on the importance of compliance with treatment and follow-up appointments.    MEDICATION ISSUES:  Discussed medication options and treatment plan of prescribed medication as well as the risks, benefits, and side effects including potential falls, possible impaired driving and metabolic adversities among others. Patient is agreeable to call the office with any worsening of symptoms or onset of side effects. Patient is agreeable to call 911 or go to the nearest ER should he/she begin having SI/HI.          This document has been electronically signed by CAROL Joshua, PMHNP-BC  August 30, 2023 10:49 EDT      Part of this note may be an electronic transcription/translation of spoken language to printed text using the Dragon Dictation System.

## 2023-09-05 LAB — DRUGS UR: NORMAL

## 2023-09-20 ENCOUNTER — TELEPHONE (OUTPATIENT)
Dept: PSYCHIATRY | Facility: CLINIC | Age: 28
End: 2023-09-20
Payer: MEDICAID

## 2023-09-20 DIAGNOSIS — F90.0 ADHD (ATTENTION DEFICIT HYPERACTIVITY DISORDER), INATTENTIVE TYPE: Primary | ICD-10-CM

## 2023-09-20 RX ORDER — AMPHETAMINE 5 MG/1
2.5 TABLET, EXTENDED RELEASE ORAL EVERY MORNING
Qty: 8 TABLET | Refills: 0 | Status: SHIPPED | OUTPATIENT
Start: 2023-09-20

## 2023-09-20 RX ORDER — AMPHETAMINE 10 MG/1
10 TABLET, EXTENDED RELEASE ORAL EVERY MORNING
Qty: 15 TABLET | Refills: 0 | Status: SHIPPED | OUTPATIENT
Start: 2023-09-20

## 2023-09-20 NOTE — TELEPHONE ENCOUNTER
Called and spoke with patient about above message. Patient stated he would call back with an update.

## 2023-09-20 NOTE — TELEPHONE ENCOUNTER
Patient called and stated that you had discussed changed Adderall to Dianabal ER 12.5 MG. States he would like that sent over now.

## 2023-09-20 NOTE — TELEPHONE ENCOUNTER
Per conversation with last appointment, switching him from Adderall to Dyanavel XR 12.5 mg daily.  Starting with 2 weeks of medication.  Please remind patient to call with an update close to the end of that time frame.  Unable to review ROWENA secondary to manual system being unavailable.

## 2023-09-26 DIAGNOSIS — K21.9 GASTROESOPHAGEAL REFLUX DISEASE WITHOUT ESOPHAGITIS: ICD-10-CM

## 2023-09-26 NOTE — TELEPHONE ENCOUNTER
Rx Refill Note  Requested Prescriptions     Pending Prescriptions Disp Refills    pantoprazole (PROTONIX) 40 MG EC tablet [Pharmacy Med Name: PANTOPRAZOLE 40MG TABLETS] 30 tablet      Sig: TAKE 1 TABLET BY MOUTH DAILY      Last office visit with prescribing clinician: 8/3/2023   Last telemedicine visit with prescribing clinician: Visit date not found   Next office visit with prescribing clinician: Visit date not found                         Would you like a call back once the refill request has been completed: [] Yes [] No    If the office needs to give you a call back, can they leave a voicemail: [] Yes [] No    Ana Hudson  09/26/23, 10:41 EDT

## 2023-09-27 RX ORDER — PANTOPRAZOLE SODIUM 40 MG/1
40 TABLET, DELAYED RELEASE ORAL DAILY
Qty: 30 TABLET | Refills: 1 | Status: SHIPPED | OUTPATIENT
Start: 2023-09-27

## 2023-10-02 ENCOUNTER — TELEPHONE (OUTPATIENT)
Dept: PSYCHIATRY | Facility: CLINIC | Age: 28
End: 2023-10-02
Payer: MEDICAID

## 2023-10-02 NOTE — TELEPHONE ENCOUNTER
Patient called to give an update on Dyanavel XR. States it has been going well and would like to continue taking medication. Stated he would send a refill request as soon as he needed it.

## 2023-10-03 DIAGNOSIS — F90.0 ADHD (ATTENTION DEFICIT HYPERACTIVITY DISORDER), INATTENTIVE TYPE: ICD-10-CM

## 2023-10-03 RX ORDER — AMPHETAMINE 5 MG/1
2.5 TABLET, EXTENDED RELEASE ORAL EVERY MORNING
Qty: 15 TABLET | Refills: 0 | Status: SHIPPED | OUTPATIENT
Start: 2023-10-03

## 2023-10-03 RX ORDER — AMPHETAMINE 10 MG/1
10 TABLET, EXTENDED RELEASE ORAL EVERY MORNING
Qty: 30 TABLET | Refills: 0 | Status: SHIPPED | OUTPATIENT
Start: 2023-10-03

## 2023-10-03 NOTE — TELEPHONE ENCOUNTER
Sent through [employer]. Pt states he's doing well per previous call. Would like to continue medication.

## 2023-10-25 ENCOUNTER — TELEMEDICINE (OUTPATIENT)
Dept: PSYCHIATRY | Facility: CLINIC | Age: 28
End: 2023-10-25
Payer: MEDICAID

## 2023-10-25 DIAGNOSIS — F90.0 ADHD (ATTENTION DEFICIT HYPERACTIVITY DISORDER), INATTENTIVE TYPE: Primary | Chronic | ICD-10-CM

## 2023-10-25 PROCEDURE — 99214 OFFICE O/P EST MOD 30 MIN: CPT | Performed by: NURSE PRACTITIONER

## 2023-10-25 PROCEDURE — 1159F MED LIST DOCD IN RCRD: CPT | Performed by: NURSE PRACTITIONER

## 2023-10-25 PROCEDURE — 1160F RVW MEDS BY RX/DR IN RCRD: CPT | Performed by: NURSE PRACTITIONER

## 2023-10-25 RX ORDER — DEXTROAMPHETAMINE SACCHARATE, AMPHETAMINE ASPARTATE, DEXTROAMPHETAMINE SULFATE AND AMPHETAMINE SULFATE 2.5; 2.5; 2.5; 2.5 MG/1; MG/1; MG/1; MG/1
10 TABLET ORAL
Qty: 30 TABLET | Refills: 0 | Status: SHIPPED | OUTPATIENT
Start: 2023-10-25

## 2023-10-25 RX ORDER — DEXTROAMPHETAMINE SACCHARATE, AMPHETAMINE ASPARTATE MONOHYDRATE, DEXTROAMPHETAMINE SULFATE AND AMPHETAMINE SULFATE 3.75; 3.75; 3.75; 3.75 MG/1; MG/1; MG/1; MG/1
15 CAPSULE, EXTENDED RELEASE ORAL DAILY
Qty: 30 CAPSULE | Refills: 0 | Status: SHIPPED | OUTPATIENT
Start: 2023-10-25

## 2023-10-25 NOTE — PROGRESS NOTES
"This provider is located at [facility] ,[address], [address] in [place], [place],using a secure MyChart Video Visit through [employer]. Patient is being seen remotely via telehealth at their home address in [place], and stated they are in a secure environment for this session. The patient's condition being diagnosed/treated is appropriate for telemedicine. The provider identified herself as well as her credentials.   The patient, and/or patients guardian, consent to be seen remotely, and when consent is given they understand that the consent allows for patient identifiable information to be sent to a third party as needed.   They may refuse to be seen remotely at any time. The electronic data is encrypted and password protected, and the patient and/or guardian has been advised of the potential risks to privacy not withstanding such measures.    Chief Complaint  ADHD      Subjective          Yobani Singer presents today via MyChart Video through [employer] for medication management of his ADHD.     History of Present Illness: Patient presents today for follow-up appointment after last been seen on 08/30/2023.  At that appointment the patient was switched from Adderall to Dyanavel XR secondary to lack of efficacy from his Adderall.  Patient says today \"I am doing okay\".  Patient says his semester is now in full swing and he is very stressed.  He says he is looking forward to Thanksgiving break because he has not got to see his parents lately and is missing them.  Patient says the switch to Dyanavel XR has not gone as well as he had hoped.  He said he initially felt that it was working just as well as Adderall and he was doing well with it, but says over the last few weeks he has begun to have side effects from it.  He says it seems to be making his anxiety significantly worse, especially in the afternoon and evening when it is wearing off.  He says he was not sure if it was " related to his medication, but began testing and realized when he did not take his medication, he did not have the anxiety issue.  Patient says because of this he would prefer to potentially go back to Adderall, and try something different again at a later date.  He says aside from that things have been going well.  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  He denies any other issues or concerns today.  He is sleeping and eating well.  Patient denies any SI/HI, A/V hallucinations.      The following portions of the patient's history were reviewed and updated as appropriate: allergies, current medications, past family history, past medical history, past social history, past surgical history and problem list.      Current Medications:   Current Outpatient Medications   Medication Sig Dispense Refill    amphetamine-dextroamphetamine (Adderall) 10 MG tablet Take 1 tablet by mouth Every Afternoon. 30 tablet 0    amphetamine-dextroamphetamine XR (Adderall XR) 15 MG 24 hr capsule Take 1 capsule by mouth Daily 30 capsule 0     No current facility-administered medications for this visit.       Mental Status Exam:   Hygiene:    MyChart video  Cooperation:  Cooperative  Eye Contact:  Good  Psychomotor Behavior:  Appropriate  Affect:  Appropriate  Mood: euthymic  Speech:  Normal  Thought Process:  Goal directed  Thought Content:  Mood congruent  Suicidal:  None  Homicidal:  None  Hallucinations:  None  Delusion:  None  Memory:  Intact  Orientation:  Person, Place, Time, and Situation  Reliability:  good  Insight:  Good  Judgement:  Good  Impulse Control:  Good  Physical/Medical Issues:   GERD      Objective   Vital Signs:   There were no vitals taken for this visit.    Physical Exam  Neurological:      Mental Status: He is oriented to person, place, and time. Mental status is at baseline.    Psychiatric:         Behavior: Behavior is cooperative.        Result Review :     The following data was reviewed by: CAROL Cherry on 10/25/2023:    Data reviewed : Previous notes, medication history           Assessment and Plan    Diagnoses and all orders for this visit:    1. ADHD (attention deficit hyperactivity disorder), inattentive type (Primary)  -     amphetamine-dextroamphetamine XR (Adderall XR) 15 MG 24 hr capsule; Take 1 capsule by mouth Daily  Dispense: 30 capsule; Refill: 0  -     amphetamine-dextroamphetamine (Adderall) 10 MG tablet; Take 1 tablet by mouth Every Afternoon.  Dispense: 30 tablet; Refill: 0           Tobacco Cessation:  Patient has denied an present or past tobacco use. No tobacco cessation education necessary.       Impression/Plan:  -This is a follow-up appointment.  Patient presents today and reports he has not been doing as well with Dyanavel XR as he initially did.  He would like to try switching back to Adderall XR with an immediate release booster in the afternoon.  Discussed the possibility of trying Vyvanse in the future.  We will avoid trying a new medication today because of the patient's current position in his school semester.  He says he would be willing to do that, so we will discuss this over the winter break.  When he was taking the combination of Adderall XR and IR, he did not feel his booster dose in the afternoon was sufficient for his ADHD symptoms at 7.5 mg.  We will increase this dose to 10 mg moving forward.  -Discontinue Dyanavel 12.5 mg daily.  -Start Adderall XR 15 mg every morning.  -Start Adderall IR 10 mg every afternoon as needed.  -The patient is being prescribed a controlled substance as part of the treatment plan. The patient/guardian has been educated of appropriate use of the medication(s), including risks and side effects such as insomnia, headache, exacerbation of tics, nervousness, irritability, overstimulation, tremor, dizziness,  anorexia or change in appetite, nausea, dry mouth, constipation, diarrhea, weight loss, sexual dysfunction, psychotic episodes, seizures, palpitations, tachycardia, hypertension, rare activation (activation of hypomania, toni, and/or suicidal ideations), cardiovascular adverse effects including sudden death (especially patients with pre-existing structural abnormalities often associated with a family history of cardiac disease), may slow normal growth in children, weight gain is reported but not expected, sedation is possible but activation is much more common, metabolic adversities, and overdose among others. Patient/guardian is also informed that the medication is to be used by the patient only, the medication is to be used only as directed, and the medication should not be combined with other substances unless directed by a Provider/Prescriber. The patient/guardian verbalized understanding and agreement with this in their own words, and wish to continue with current treatment plan.  Controlled substance agreement completed and filed in the patient's chart.  -Patient's ROWENA report reviewed and deemed appropriate.  Patient counseled on use of controlled substances.  UDS performed 08/30/2023.  -Reviewed available lab work.  -Schedule MyChart video follow-up appointment for 8 weeks or as needed.    MEDS ORDERED DURING VISIT:  New Medications Ordered This Visit   Medications    amphetamine-dextroamphetamine XR (Adderall XR) 15 MG 24 hr capsule     Sig: Take 1 capsule by mouth Daily     Dispense:  30 capsule     Refill:  0    amphetamine-dextroamphetamine (Adderall) 10 MG tablet     Sig: Take 1 tablet by mouth Every Afternoon.     Dispense:  30 tablet     Refill:  0         Follow Up   Return in about 8 weeks (around 12/20/2023), or if symptoms worsen or fail to improve, for Next scheduled follow up, Video visit.  Patient was given instructions and counseling regarding his condition or for health maintenance advice.  Please see specific information pulled into the AVS if appropriate.       TREATMENT PLAN/GOALS: Continue supportive psychotherapy efforts and medications as indicated. Treatment and medication options discussed during today's visit. Patient acknowledged and verbally consented to continue with current treatment plan and was educated on the importance of compliance with treatment and follow-up appointments.    MEDICATION ISSUES:  Discussed medication options and treatment plan of prescribed medication as well as the risks, benefits, and side effects including potential falls, possible impaired driving and metabolic adversities among others. Patient is agreeable to call the office with any worsening of symptoms or onset of side effects. Patient is agreeable to call 911 or go to the nearest ER should he/she begin having SI/HI.          This document has been electronically signed by CAROL Joshua, PMHNP-BC  October 25, 2023 10:54 EDT      Part of this note may be an electronic transcription/translation of spoken language to printed text using the Dragon Dictation System.

## 2023-11-22 DIAGNOSIS — F90.0 ADHD (ATTENTION DEFICIT HYPERACTIVITY DISORDER), INATTENTIVE TYPE: Chronic | ICD-10-CM

## 2023-11-22 RX ORDER — DEXTROAMPHETAMINE SACCHARATE, AMPHETAMINE ASPARTATE MONOHYDRATE, DEXTROAMPHETAMINE SULFATE AND AMPHETAMINE SULFATE 3.75; 3.75; 3.75; 3.75 MG/1; MG/1; MG/1; MG/1
15 CAPSULE, EXTENDED RELEASE ORAL DAILY
Qty: 30 CAPSULE | Refills: 0 | Status: SHIPPED | OUTPATIENT
Start: 2023-11-22

## 2023-11-22 RX ORDER — DEXTROAMPHETAMINE SACCHARATE, AMPHETAMINE ASPARTATE, DEXTROAMPHETAMINE SULFATE AND AMPHETAMINE SULFATE 2.5; 2.5; 2.5; 2.5 MG/1; MG/1; MG/1; MG/1
10 TABLET ORAL
Qty: 30 TABLET | Refills: 0 | Status: SHIPPED | OUTPATIENT
Start: 2023-11-22

## 2023-12-20 ENCOUNTER — TELEMEDICINE (OUTPATIENT)
Dept: PSYCHIATRY | Facility: CLINIC | Age: 28
End: 2023-12-20
Payer: COMMERCIAL

## 2023-12-20 DIAGNOSIS — F90.0 ADHD (ATTENTION DEFICIT HYPERACTIVITY DISORDER), INATTENTIVE TYPE: Primary | Chronic | ICD-10-CM

## 2023-12-20 PROCEDURE — 99213 OFFICE O/P EST LOW 20 MIN: CPT | Performed by: NURSE PRACTITIONER

## 2023-12-20 RX ORDER — DEXTROAMPHETAMINE SACCHARATE, AMPHETAMINE ASPARTATE MONOHYDRATE, DEXTROAMPHETAMINE SULFATE AND AMPHETAMINE SULFATE 3.75; 3.75; 3.75; 3.75 MG/1; MG/1; MG/1; MG/1
15 CAPSULE, EXTENDED RELEASE ORAL DAILY
Qty: 30 CAPSULE | Refills: 0 | Status: SHIPPED | OUTPATIENT
Start: 2023-12-20

## 2023-12-20 RX ORDER — DEXTROAMPHETAMINE SACCHARATE, AMPHETAMINE ASPARTATE, DEXTROAMPHETAMINE SULFATE AND AMPHETAMINE SULFATE 2.5; 2.5; 2.5; 2.5 MG/1; MG/1; MG/1; MG/1
10 TABLET ORAL
Qty: 30 TABLET | Refills: 0 | Status: SHIPPED | OUTPATIENT
Start: 2023-12-20

## 2023-12-20 NOTE — PROGRESS NOTES
"This provider is located at [facility], Behavioral Health ,[address], [address] in [place], [place],using a secure MyChart Video Visit through [employer]. Patient is being seen remotely via telehealth at their home address in [place], and stated they are in a secure environment for this session. The patient's condition being diagnosed/treated is appropriate for telemedicine. The provider identified herself as well as her credentials.   The patient, and/or patients guardian, consent to be seen remotely, and when consent is given they understand that the consent allows for patient identifiable information to be sent to a third party as needed.   They may refuse to be seen remotely at any time. The electronic data is encrypted and password protected, and the patient and/or guardian has been advised of the potential risks to privacy not withstanding such measures.    Chief Complaint  ADHD      Subjective              Yobani Singer presents today via MyChart Video through [employer] for medication management of his ADHD.     History of Present Illness: Patient presents today for follow-up appointment after last been seen on 10/25/2023.  Patient says \"I am doing really good\".  He says he turned his final paper in for the semester 2 days ago and feels free for the first time.  He is looking forward to having the next few weeks of very little responsibility.  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  He is going to go home for the Christmas holiday and is looking forward to seeing his family.  Patient says he is starting get nervous about his neck semester being his last semester.  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  He says he has continued to take his medications on a consistent basis and says he is still glad to have switched back to Adderall a couple months ago.  \"It has just been the most effective for me, and I " "am in a really good spot with it\".  He says he has continued to sleep and eat well and denies any significant concerns with either.  He denies any depression or anxiety symptoms and says he is happy overall with how well he has continued to do.  Patient denies any SI/HI, A/V hallucinations.      The following portions of the patient's history were reviewed and updated as appropriate: allergies, current medications, past family history, past medical history, past social history, past surgical history and problem list.      Current Medications:   Current Outpatient Medications   Medication Sig Dispense Refill    amphetamine-dextroamphetamine (Adderall) 10 MG tablet Take 1 tablet by mouth Every Afternoon. 30 tablet 0    amphetamine-dextroamphetamine XR (Adderall XR) 15 MG 24 hr capsule Take 1 capsule by mouth Daily 30 capsule 0     No current facility-administered medications for this visit.       Mental Status Exam:   Hygiene:    MyChart video  Cooperation:  Cooperative  Eye Contact:  Good  Psychomotor Behavior:  Appropriate  Affect:  Appropriate  Mood: euthymic  Speech:  Normal  Thought Process:  Goal directed  Thought Content:  Mood congruent  Suicidal:  None  Homicidal:  None  Hallucinations:  None  Delusion:  None  Memory:  Intact  Orientation:  Person, Place, Time, and Situation  Reliability:  good  Insight:  Good  Judgement:  Good  Impulse Control:  Good  Physical/Medical Issues:   GERD      Objective   Vital Signs:   There were no vitals taken for this visit.    Physical Exam  Neurological:      Mental Status: He is oriented to person, place, and time. Mental status is at baseline.   Psychiatric:         Behavior: Behavior is cooperative.      Result Review :     The following data was reviewed by: CAROL Cherry on 12/20/2023:    Data reviewed : Previous notes, medication history           Assessment and Plan    Diagnoses and all orders for this visit:    1. ADHD (attention deficit hyperactivity " disorder), inattentive type (Primary)  -     amphetamine-dextroamphetamine XR (Adderall XR) 15 MG 24 hr capsule; Take 1 capsule by mouth Daily  Dispense: 30 capsule; Refill: 0  -     amphetamine-dextroamphetamine (Adderall) 10 MG tablet; Take 1 tablet by mouth Every Afternoon.  Dispense: 30 tablet; Refill: 0           Depression Screening:  Patient screened positive for depression based on a PHQ-9 score of 0 on 12/20/2023. Follow-up recommendations include: Suicide Risk Assessment performed.        Tobacco Cessation:  Patient has denied an present or past tobacco use. No tobacco cessation education necessary.       Impression/Plan:  -This is a follow-up appointment.  Patient presents today and reports he has been doing well overall since his last appointment.  He says he is taking his medication as prescribed and denies any adverse effects associated with them.  He feels his current combination provides adequate management for his ADHD symptom burden.  He is able to stay focused in class and study as he needs to.  He is happy with how well he has continued to do.  He says he has no new issues or concerns that he feels need addressing today.  -Maintain Adderall XR 15 mg every morning.  -Maintain Adderall IR 10 mg every afternoon as needed.  -The patient is being prescribed a controlled substance as part of the treatment plan. The patient/guardian has been educated of appropriate use of the medication(s), including risks and side effects such as insomnia, headache, exacerbation of tics, nervousness, irritability, overstimulation, tremor, dizziness, anorexia or change in appetite, nausea, dry mouth, constipation, diarrhea, weight loss, sexual dysfunction, psychotic episodes, seizures, palpitations, tachycardia, hypertension, rare activation (activation of hypomania, toni, and/or suicidal ideations), cardiovascular adverse effects including sudden death (especially patients with pre-existing structural abnormalities  often associated with a family history of cardiac disease), may slow normal growth in children, weight gain is reported but not expected, sedation is possible but activation is much more common, metabolic adversities, and overdose among others. Patient/guardian is also informed that the medication is to be used by the patient only, the medication is to be used only as directed, and the medication should not be combined with other substances unless directed by a Provider/Prescriber. The patient/guardian verbalized understanding and agreement with this in their own words, and wish to continue with current treatment plan.  Controlled substance agreement completed and filed in the patient's chart.  -Attempted to review patient's ROWENA, however patient is not found in PDMP.  Manual process ROWENA report has been pending on website for greater than 2 hours.  Unable to confirm ROWENA.  Patient counseled on use of controlled substances.  UDS performed 08/30/2023.  -Reviewed available lab work.  -Schedule MyChart video follow-up appointment for 12 weeks or as needed.    MEDS ORDERED DURING VISIT:  New Medications Ordered This Visit   Medications    amphetamine-dextroamphetamine XR (Adderall XR) 15 MG 24 hr capsule     Sig: Take 1 capsule by mouth Daily     Dispense:  30 capsule     Refill:  0    amphetamine-dextroamphetamine (Adderall) 10 MG tablet     Sig: Take 1 tablet by mouth Every Afternoon.     Dispense:  30 tablet     Refill:  0         Follow Up   Return in about 12 weeks (around 3/13/2024), or if symptoms worsen or fail to improve, for Next scheduled follow up, Video visit.  Patient was given instructions and counseling regarding his condition or for health maintenance advice. Please see specific information pulled into the AVS if appropriate.       TREATMENT PLAN/GOALS: Continue supportive psychotherapy efforts and medications as indicated. Treatment and medication options discussed during today's visit. Patient  acknowledged and verbally consented to continue with current treatment plan and was educated on the importance of compliance with treatment and follow-up appointments.    MEDICATION ISSUES:  Discussed medication options and treatment plan of prescribed medication as well as the risks, benefits, and side effects including potential falls, possible impaired driving and metabolic adversities among others. Patient is agreeable to call the office with any worsening of symptoms or onset of side effects. Patient is agreeable to call 911 or go to the nearest ER should he/she begin having SI/HI.          This document has been electronically signed by CAROL Joshua, PMHNP-BC  December 20, 2023 10:31 EST      Part of this note may be an electronic transcription/translation of spoken language to printed text using the Dragon Dictation System.

## 2024-01-15 DIAGNOSIS — F90.0 ADHD (ATTENTION DEFICIT HYPERACTIVITY DISORDER), INATTENTIVE TYPE: Chronic | ICD-10-CM

## 2024-01-15 RX ORDER — DEXTROAMPHETAMINE SACCHARATE, AMPHETAMINE ASPARTATE, DEXTROAMPHETAMINE SULFATE AND AMPHETAMINE SULFATE 2.5; 2.5; 2.5; 2.5 MG/1; MG/1; MG/1; MG/1
10 TABLET ORAL
Qty: 30 TABLET | Refills: 0 | Status: SHIPPED | OUTPATIENT
Start: 2024-01-15

## 2024-01-15 RX ORDER — DEXTROAMPHETAMINE SACCHARATE, AMPHETAMINE ASPARTATE MONOHYDRATE, DEXTROAMPHETAMINE SULFATE AND AMPHETAMINE SULFATE 3.75; 3.75; 3.75; 3.75 MG/1; MG/1; MG/1; MG/1
15 CAPSULE, EXTENDED RELEASE ORAL DAILY
Qty: 30 CAPSULE | Refills: 0 | Status: SHIPPED | OUTPATIENT
Start: 2024-01-15

## 2024-02-14 DIAGNOSIS — F90.0 ADHD (ATTENTION DEFICIT HYPERACTIVITY DISORDER), INATTENTIVE TYPE: Chronic | ICD-10-CM

## 2024-02-14 RX ORDER — DEXTROAMPHETAMINE SACCHARATE, AMPHETAMINE ASPARTATE MONOHYDRATE, DEXTROAMPHETAMINE SULFATE AND AMPHETAMINE SULFATE 3.75; 3.75; 3.75; 3.75 MG/1; MG/1; MG/1; MG/1
15 CAPSULE, EXTENDED RELEASE ORAL DAILY
Qty: 30 CAPSULE | Refills: 0 | Status: SHIPPED | OUTPATIENT
Start: 2024-02-14

## 2024-02-14 RX ORDER — DEXTROAMPHETAMINE SACCHARATE, AMPHETAMINE ASPARTATE, DEXTROAMPHETAMINE SULFATE AND AMPHETAMINE SULFATE 2.5; 2.5; 2.5; 2.5 MG/1; MG/1; MG/1; MG/1
10 TABLET ORAL
Qty: 30 TABLET | Refills: 0 | Status: SHIPPED | OUTPATIENT
Start: 2024-02-14

## 2024-03-13 ENCOUNTER — TELEMEDICINE (OUTPATIENT)
Dept: PSYCHIATRY | Facility: CLINIC | Age: 29
End: 2024-03-13
Payer: COMMERCIAL

## 2024-03-13 DIAGNOSIS — F90.0 ADHD (ATTENTION DEFICIT HYPERACTIVITY DISORDER), INATTENTIVE TYPE: Primary | Chronic | ICD-10-CM

## 2024-03-13 RX ORDER — METHYLPHENIDATE HYDROCHLORIDE 36 MG/1
36 TABLET ORAL DAILY
Qty: 30 TABLET | Refills: 0 | Status: SHIPPED | OUTPATIENT
Start: 2024-03-13

## 2024-03-13 NOTE — PROGRESS NOTES
"This provider is located at [facility], Behavioral Health ,[address], [address] in [place], [place],using a secure MyChart Video Visit through [employer]. Patient is being seen remotely via telehealth at their home address in [place], and stated they are in a secure environment for this session. The patient's condition being diagnosed/treated is appropriate for telemedicine. The provider identified herself as well as her credentials.   The patient, and/or patients guardian, consent to be seen remotely, and when consent is given they understand that the consent allows for patient identifiable information to be sent to a third party as needed.   They may refuse to be seen remotely at any time. The electronic data is encrypted and password protected, and the patient and/or guardian has been advised of the potential risks to privacy not withstanding such measures.    Chief Complaint  ADHD      Subjective          Yobani Singer presents today via MyChart Video through [employer] for medication management of his ADHD.    History of Present Illness: Patient presents today for follow-up appointment after last been seen on 12/20/2023.  Patient says \"I am doing pretty rough right now\".  He currently has an upper respiratory infection but says he has been continuing to struggle with significant GERD.  This has been an issue for him on and off over the last year.  He has tried going through an elimination diet to see if he could find something he was eating that was causing the issue but nothing seemed to help.  Patient says he then stopped taking his medication to see if that was contributing and over the course of a week his symptoms went away completely.  He says they then resumed as soon as he restarted his medication.  He feels his Adderall is certainly contributing to his problems.  Patient says school has been going okay, but says this semester has been difficult because of the problems he is " having with his GI tract as well as it being his final semester.  He says he is sleeping fine and denies any concerns with either.  His diet has been impacted by his acid reflux.  Patient denies any SI/HI, A/V hallucinations.      The following portions of the patient's history were reviewed and updated as appropriate: allergies, current medications, past family history, past medical history, past social history, past surgical history and problem list.      Current Medications:   Current Outpatient Medications   Medication Sig Dispense Refill    methylphenidate (Concerta) 36 MG CR tablet Take 1 tablet by mouth Daily 30 tablet 0     No current facility-administered medications for this visit.       Mental Status Exam:   Hygiene:    MyChart video  Cooperation:  Cooperative  Eye Contact:  Good  Psychomotor Behavior:  Appropriate  Affect:  Appropriate  Mood: euthymic  Speech:  Normal  Thought Process:  Goal directed  Thought Content:  Mood congruent  Suicidal:  None  Homicidal:  None  Hallucinations:  None  Delusion:  None  Memory:  Intact  Orientation:  Person, Place, Time, and Situation  Reliability:  good  Insight:  Good  Judgement:  Good  Impulse Control:  Good  Physical/Medical Issues:   GERD      Objective   Vital Signs:   There were no vitals taken for this visit.    Physical Exam  Neurological:      Mental Status: He is oriented to person, place, and time. Mental status is at baseline.   Psychiatric:         Behavior: Behavior is cooperative.        Result Review :     The following data was reviewed by: CAROL Cherry on 03/13/2024:    Data reviewed : Previous note, medication history           Assessment and Plan    Diagnoses and all orders for this visit:    1. ADHD (attention deficit hyperactivity disorder), inattentive type (Primary)  -     methylphenidate (Concerta) 36 MG CR tablet; Take 1 tablet by mouth Daily  Dispense: 30 tablet; Refill: 0         PHQ-9 Score:   PHQ-9 Total Score: (P)  4      Depression Screening:  Patient screened positive for depression based on a PHQ-9 score of 4 on 3/12/2024. Follow-up recommendations include: Suicide Risk Assessment performed.        Tobacco Cessation:  Patient has denied an present or past tobacco use. No tobacco cessation education necessary.   Patient and reports today he also stopped vaping approximately 3 weeks ago.    Impression/Plan:  -This a follow-up appointment.  Patient presents today and says he has been struggling with acid reflux that he feels may be related to his medication for his ADHD.  He is interested in trying a different medication.  He has previously taken Dyanavel, but did not tolerate the medication well.  I would recommend switching from an amphetamine to a methylphenidate based product.  He has never taken methylphenidate medication before.  Recommend switching to Concerta and starting at 36 mg.  Patient says he is open to doing this.  I also recommend he reach out to his primary care for referral to GI to further investigate his stomach issues.  -Discontinue Adderall XR and IR.  -Start Concerta 36 mg daily for ADHD.  We discussed risks versus benefits, as well as potential adverse effects associated with adding this medication to patient's daily regimen. Patient is in agreement with this plan and was educated on the importance of compliance with all aspects of treatment and follow-up appointments. Patient is agreeable to call the office with any worsening of symptoms or onset of side effects.  -The patient is being prescribed a controlled substance as part of the treatment plan. The patient/guardian has been educated of appropriate use of the medication(s), including risks and side effects such as insomnia, headache, exacerbation of tics, nervousness, irritability, overstimulation, tremor, dizziness, anorexia or change in appetite, nausea, dry mouth, constipation, diarrhea, weight loss, sexual dysfunction, psychotic episodes,  seizures, palpitations, tachycardia, hypertension, rare activation (activation of hypomania, toni, and/or suicidal ideations), cardiovascular adverse effects including sudden death (especially patients with pre-existing structural abnormalities often associated with a family history of cardiac disease), may slow normal growth in children, weight gain is reported but not expected, sedation is possible but activation is much more common, metabolic adversities, and overdose among others. Patient/guardian is also informed that the medication is to be used by the patient only, the medication is to be used only as directed, and the medication should not be combined with other substances unless directed by a Provider/Prescriber. The patient/guardian verbalized understanding and agreement with this in their own words, and wish to continue with current treatment plan.  Controlled substance agreement completed and filed in the patient's chart.  -Patient's ROWENA report reviewed and deemed appropriate.  Patient counseled on use of controlled substances.  UDS performed 08/30/2023.  -Reviewed available lab work.  -Schedule MyChart video follow-up appointment for 6 weeks or as needed.    MEDS ORDERED DURING VISIT:  New Medications Ordered This Visit   Medications    methylphenidate (Concerta) 36 MG CR tablet     Sig: Take 1 tablet by mouth Daily     Dispense:  30 tablet     Refill:  0       I spent 41 minutes caring for Yobani on this date of service. This time includes time spent by me in the following activities:preparing for the visit, reviewing tests, performing a medically appropriate examination and/or evaluation , counseling and educating the patient/family/caregiver, ordering medications, tests, or procedures, and documenting information in the medical record  Follow Up   Return in about 6 weeks (around 4/24/2024), or if symptoms worsen or fail to improve, for Next scheduled follow up, Video visit.  Patient was given  instructions and counseling regarding his condition or for health maintenance advice. Please see specific information pulled into the AVS if appropriate.       TREATMENT PLAN/GOALS: Continue supportive psychotherapy efforts and medications as indicated. Treatment and medication options discussed during today's visit. Patient acknowledged and verbally consented to continue with current treatment plan and was educated on the importance of compliance with treatment and follow-up appointments.    MEDICATION ISSUES:  Discussed medication options and treatment plan of prescribed medication as well as the risks, benefits, and side effects including potential falls, possible impaired driving and metabolic adversities among others. Patient is agreeable to call the office with any worsening of symptoms or onset of side effects. Patient is agreeable to call 911 or go to the nearest ER should he/she begin having SI/HI.          This document has been electronically signed by CAROL Joshua, PMHNP-BC  March 13, 2024 11:05 EDT      Part of this note may be an electronic transcription/translation of spoken language to printed text using the Dragon Dictation System.

## 2024-04-09 DIAGNOSIS — F90.0 ADHD (ATTENTION DEFICIT HYPERACTIVITY DISORDER), INATTENTIVE TYPE: Chronic | ICD-10-CM

## 2024-04-10 RX ORDER — METHYLPHENIDATE HYDROCHLORIDE 36 MG/1
36 TABLET ORAL DAILY
Qty: 30 TABLET | Refills: 0 | Status: SHIPPED | OUTPATIENT
Start: 2024-04-10

## 2024-04-10 NOTE — TELEPHONE ENCOUNTER
Patient's ROWENA report reviewed and deemed appropriate.  Patient counseled on use of controlled substances.

## 2024-05-08 ENCOUNTER — TELEMEDICINE (OUTPATIENT)
Dept: PSYCHIATRY | Facility: CLINIC | Age: 29
End: 2024-05-08
Payer: COMMERCIAL

## 2024-05-08 DIAGNOSIS — F90.0 ADHD (ATTENTION DEFICIT HYPERACTIVITY DISORDER), INATTENTIVE TYPE: Primary | Chronic | ICD-10-CM

## 2024-05-08 DIAGNOSIS — F90.0 ADHD (ATTENTION DEFICIT HYPERACTIVITY DISORDER), INATTENTIVE TYPE: Chronic | ICD-10-CM

## 2024-05-08 PROCEDURE — 99213 OFFICE O/P EST LOW 20 MIN: CPT | Performed by: NURSE PRACTITIONER

## 2024-05-08 RX ORDER — METHYLPHENIDATE HYDROCHLORIDE 36 MG/1
36 TABLET ORAL DAILY
Qty: 30 TABLET | Refills: 0 | Status: SHIPPED | OUTPATIENT
Start: 2024-05-08

## 2024-05-30 ENCOUNTER — TELEPHONE (OUTPATIENT)
Dept: PSYCHIATRY | Facility: CLINIC | Age: 29
End: 2024-05-30
Payer: COMMERCIAL

## 2024-05-30 DIAGNOSIS — F90.0 ADHD (ATTENTION DEFICIT HYPERACTIVITY DISORDER), INATTENTIVE TYPE: Chronic | ICD-10-CM

## 2024-05-30 RX ORDER — METHYLPHENIDATE HYDROCHLORIDE 27 MG/1
27 TABLET ORAL DAILY
Qty: 14 TABLET | Refills: 0 | Status: SHIPPED | OUTPATIENT
Start: 2024-05-30

## 2024-05-30 NOTE — TELEPHONE ENCOUNTER
Let's decrease his dose from 36 mg to 27 mg.  I will send in 2 weeks worth.  Have him call with an update at the end of that to see if that dose works better for him.

## 2024-05-30 NOTE — TELEPHONE ENCOUNTER
Patient called in left message provider had advised him if he felt Concerta was to strong to call in and let us know. And see if dose can be decreased.  He said when he was on a XR and and IR. 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 No trouble sleeping but he doesn't go to sleep until midnight anyway's. Please advise

## 2024-06-14 DIAGNOSIS — F90.0 ADHD (ATTENTION DEFICIT HYPERACTIVITY DISORDER), INATTENTIVE TYPE: Chronic | ICD-10-CM

## 2024-06-17 RX ORDER — METHYLPHENIDATE HYDROCHLORIDE 27 MG/1
27 TABLET ORAL DAILY
Qty: 30 TABLET | Refills: 0 | Status: SHIPPED | OUTPATIENT
Start: 2024-06-17

## 2024-07-11 DIAGNOSIS — F90.0 ADHD (ATTENTION DEFICIT HYPERACTIVITY DISORDER), INATTENTIVE TYPE: Chronic | ICD-10-CM

## 2024-07-11 RX ORDER — METHYLPHENIDATE HYDROCHLORIDE 27 MG/1
27 TABLET ORAL DAILY
Qty: 30 TABLET | Refills: 0 | Status: SHIPPED | OUTPATIENT
Start: 2024-07-11

## 2024-08-12 DIAGNOSIS — F90.0 ADHD (ATTENTION DEFICIT HYPERACTIVITY DISORDER), INATTENTIVE TYPE: Chronic | ICD-10-CM

## 2024-08-12 RX ORDER — METHYLPHENIDATE HYDROCHLORIDE 27 MG/1
27 TABLET ORAL DAILY
Qty: 30 TABLET | Refills: 0 | Status: SHIPPED | OUTPATIENT
Start: 2024-08-12

## 2024-09-10 ENCOUNTER — OFFICE VISIT (OUTPATIENT)
Dept: PSYCHIATRY | Facility: CLINIC | Age: 29
End: 2024-09-10

## 2024-09-10 VITALS
OXYGEN SATURATION: 99 % | HEART RATE: 99 BPM | BODY MASS INDEX: 25.37 KG/M2 | SYSTOLIC BLOOD PRESSURE: 134 MMHG | DIASTOLIC BLOOD PRESSURE: 84 MMHG | WEIGHT: 177.2 LBS | HEIGHT: 70 IN

## 2024-09-10 DIAGNOSIS — Z79.899 MEDICATION MANAGEMENT: ICD-10-CM

## 2024-09-10 DIAGNOSIS — F90.0 ADHD (ATTENTION DEFICIT HYPERACTIVITY DISORDER), INATTENTIVE TYPE: Primary | Chronic | ICD-10-CM

## 2024-09-10 PROCEDURE — 99213 OFFICE O/P EST LOW 20 MIN: CPT | Performed by: NURSE PRACTITIONER

## 2024-09-10 RX ORDER — METHYLPHENIDATE 1.6 MG/H
1 PATCH TRANSDERMAL DAILY
Qty: 30 PATCH | Refills: 0 | Status: SHIPPED | OUTPATIENT
Start: 2024-09-10

## 2024-09-10 NOTE — PROGRESS NOTES
"Chief Complaint  ADHD    Subjective          Yobani Singer presents to [facility] for medication management of his ADHD.     History of Present Illness: Patient presents today for follow-up appointment after last being seen on 05/08/2024.  At that appointment he was maintained on his medication, however between appointments the patient requested a lower dose of his medication and he has been taking Concerta 27 mg consistently since the end of May.  Patient presents today and says \"I guess I have been all right\".  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  He is still waiting on his results and admits to being frustrated about that.  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  Patient says he feels somewhat confident.  Patient reports that he decrease in his Concerta seems to work well for his ADHD symptoms and alleviated much of his GI distress he was experiencing with the previous dose.  However over the last month he has begun to experience \"nocturnal panic attacks\".  Patient says he is not sure what is causing them but says they do not happen when he does not take his medication.  Because of this he feels it is most likely related to his medication.  Patient says his situation is also currently somewhat complicated now because of his lack of health insurance.  His current employer does not offer any type of insurance.  He has been paying for his medication out of pocket, which has been somewhat difficult.  He is hoping to have insurance soon, he just needs to go apply for it.  Aside from that he feels things have been going well for the most part.  He reports he is eating well, better than he was.  His sleep is okay as long as he does not have a panic attack.  He denies any significant concerns regarding mood dysfunction or anxiety outside of the already discussed situations.  He denies any SI/HI, A/V " "hallucinations.      The following portions of the patient's history were reviewed and updated as appropriate: allergies, current medications, past family history, past medical history, past social history, past surgical history and problem list.      Current Medications:   Current Outpatient Medications   Medication Sig Dispense Refill    methylphenidate (Daytrana) 15 MG/9HR Place 1 patch on the skin as directed by provider Daily wear patch for 9 hours only each day 30 patch 0     No current facility-administered medications for this visit.       Mental Status Exam:   Hygiene:   good  Cooperation:  Cooperative  Eye Contact:  Good  Psychomotor Behavior:  Appropriate  Affect:  Appropriate  Mood: euthymic  Speech:  Normal  Thought Process:  Goal directed  Thought Content:  Mood congruent  Suicidal:  None  Homicidal:  None  Hallucinations:  None  Delusion:  None  Memory:  Intact  Orientation:  Person, Place, Time, and Situation  Reliability:  good  Insight:  Good  Judgement:  Good  Impulse Control:  Good  Physical/Medical Issues:   GERD          Objective   Vital Signs:   /84   Pulse 99   Ht 176.5 cm (69.5\")   Wt 80.4 kg (177 lb 3.2 oz)   SpO2 99%   BMI 25.79 kg/m²     Physical Exam  Neurological:      Mental Status: He is oriented to person, place, and time. Mental status is at baseline.      Coordination: Coordination is intact.      Gait: Gait is intact.        Result Review :     The following data was reviewed by: CAROL Cherry on 09/10/2024:    Data reviewed : Previous notes, medication history           Assessment and Plan    Diagnoses and all orders for this visit:    1. ADHD (attention deficit hyperactivity disorder), inattentive type (Primary)  -     Cancel: Compliance Drug Analysis, Ur - Urine, Clean Catch; Future  -     Cancel: Compliance Drug Analysis, Ur - Urine, Clean Catch  -     methylphenidate (Daytrana) 15 MG/9HR; Place 1 patch on the skin as directed by provider Daily wear patch " for 9 hours only each day  Dispense: 30 patch; Refill: 0    2. Medication management  -     Cancel: Compliance Drug Analysis, Ur - Urine, Clean Catch; Future  -     Cancel: Compliance Drug Analysis, Ur - Urine, Clean Catch         PHQ-9 Score:   PHQ-9 Total Score: 2      Depression Screening:  Patient screened positive for depression based on a PHQ-9 score of 2 on 9/10/2024. Follow-up recommendations include: Suicide Risk Assessment performed.        Tobacco Cessation:  Patient has denied an present or past tobacco use. No tobacco cessation education necessary.       Impression/Plan:  -This is a follow-up appointment.  Patient presents today and reports he feels he has been doing okay overall.  As discussed above he has been taking his medication and feels the decreased dose was beneficial, but he is continuing to have adverse effects associated with his medication.  I do feel it is likely his current difficulties are related to acid reflux as opposed to his medication, although it is possible his medication is exacerbating his acid reflux.  Patient previously took Adderall for the management of his ADHD and experienced significant acid reflux when taking it.  Patient may do better with a transdermal medication approach rather than switching to a different oral medication.  Discussed this at length during the appointment and the patient says he would like to try switching to a transdermal medication.  Discussed cost without insurance and the patient says he can afford to pay it for a month, and he is going to focus on getting insurance over the next 30 days.  -Discontinue Concerta 27 mg daily for ADHD.  -Start Daytrana 15 mg 1 patch every morning for ADHD.  We discussed risks versus benefits, as well as potential adverse effects associated with adding this medication to patient's daily regimen. Patient is in agreement with this plan and was educated on the importance of compliance with all aspects of treatment and  follow-up appointments. Patient is agreeable to call the office with any worsening of symptoms or onset of side effects.  -The patient is being prescribed a controlled substance as part of the treatment plan. The patient/guardian has been educated of appropriate use of the medication(s), including risks and side effects such as insomnia, headache, exacerbation of tics, nervousness, irritability, overstimulation, tremor, dizziness, anorexia or change in appetite, nausea, dry mouth, constipation, diarrhea, weight loss, sexual dysfunction, psychotic episodes, seizures, palpitations, tachycardia, hypertension, rare activation (activation of hypomania, toni, and/or suicidal ideations), cardiovascular adverse effects including sudden death (especially patients with pre-existing structural abnormalities often associated with a family history of cardiac disease), may slow normal growth in children, weight gain is reported but not expected, sedation is possible but activation is much more common, metabolic adversities, and overdose among others. Patient/guardian is also informed that the medication is to be used by the patient only, the medication is to be used only as directed, and the medication should not be combined with other substances unless directed by a Provider/Prescriber. The patient/guardian verbalized understanding and agreement with this in their own words, and wish to continue with current treatment plan.  Controlled substance agreement completed and filed in the patient's chart.  -Attempted to review ROWENA, but manual system currently unavailable.  Patient counseled on use of controlled substances.  UDS performed 08/30/2023.  Patient is currently without insurance, he will have insurance soon, we will do his UDS at that time.  -Reviewed available lab work.  -Schedule MyChart video follow-up appointment for 1 month or as needed.      MEDS ORDERED DURING VISIT:  New Medications Ordered This Visit    Medications    methylphenidate (Daytrana) 15 MG/9HR     Sig: Place 1 patch on the skin as directed by provider Daily wear patch for 9 hours only each day     Dispense:  30 patch     Refill:  0         Follow Up   Return in about 1 month (around 10/10/2024), or if symptoms worsen or fail to improve, for Next scheduled follow up, Video visit.  Patient was given instructions and counseling regarding his condition or for health maintenance advice. Please see specific information pulled into the AVS if appropriate.       TREATMENT PLAN/GOALS: Continue supportive psychotherapy efforts and medications as indicated. Treatment and medication options discussed during today's visit. Patient acknowledged and verbally consented to continue with current treatment plan and was educated on the importance of compliance with treatment and follow-up appointments.    MEDICATION ISSUES:  Discussed medication options and treatment plan of prescribed medication as well as the risks, benefits, and side effects including potential falls, possible impaired driving and metabolic adversities among others. Patient is agreeable to call the office with any worsening of symptoms or onset of side effects. Patient is agreeable to call 911 or go to the nearest ER should he/she begin having SI/HI.          This document has been electronically signed by CAROL Joshua, PMHNP-BC  September 10, 2024 15:02 EDT      Part of this note may be an electronic transcription/translation of spoken language to printed text using the Dragon Dictation System.

## 2024-10-14 DIAGNOSIS — F90.0 ADHD (ATTENTION DEFICIT HYPERACTIVITY DISORDER), INATTENTIVE TYPE: Chronic | ICD-10-CM

## 2024-10-14 RX ORDER — METHYLPHENIDATE 1.6 MG/H
1 PATCH TRANSDERMAL DAILY
Qty: 30 PATCH | Refills: 0 | OUTPATIENT
Start: 2024-10-14

## 2024-10-14 RX ORDER — METHYLPHENIDATE 1.6 MG/H
1 PATCH TRANSDERMAL DAILY
Qty: 30 PATCH | Refills: 0 | Status: SHIPPED | OUTPATIENT
Start: 2024-10-14

## 2024-10-14 NOTE — TELEPHONE ENCOUNTER
Pt called and stated medication working well at current dose and would like to continue.     Rx Refill Note  Requested Prescriptions     Pending Prescriptions Disp Refills    methylphenidate (Daytrana) 15 MG/9HR 30 patch 0     Sig: Place 1 patch on the skin as directed by provider Daily wear patch for 9 hours only each day      Last office visit with prescribing clinician: 9/10/2024   Last telemedicine visit with prescribing clinician: 5/8/2024   Next office visit with prescribing clinician: 11/1/2024                         Would you like a call back once the refill request has been completed: [] Yes [] No    If the office needs to give you a call back, can they leave a voicemail: [] Yes [] No    Xu Young MA  10/14/24, 14:52 EDT

## 2025-01-24 ENCOUNTER — LAB (OUTPATIENT)
Dept: LAB | Facility: HOSPITAL | Age: 30
End: 2025-01-24
Payer: COMMERCIAL

## 2025-01-24 ENCOUNTER — OFFICE VISIT (OUTPATIENT)
Dept: FAMILY MEDICINE CLINIC | Facility: CLINIC | Age: 30
End: 2025-01-24
Payer: COMMERCIAL

## 2025-01-24 VITALS
WEIGHT: 183.8 LBS | SYSTOLIC BLOOD PRESSURE: 118 MMHG | DIASTOLIC BLOOD PRESSURE: 60 MMHG | HEIGHT: 69 IN | BODY MASS INDEX: 27.22 KG/M2 | HEART RATE: 80 BPM | RESPIRATION RATE: 20 BRPM | TEMPERATURE: 97.8 F | OXYGEN SATURATION: 97 %

## 2025-01-24 DIAGNOSIS — K21.9 GASTROESOPHAGEAL REFLUX DISEASE WITHOUT ESOPHAGITIS: Primary | ICD-10-CM

## 2025-01-24 DIAGNOSIS — K21.9 GASTROESOPHAGEAL REFLUX DISEASE WITHOUT ESOPHAGITIS: ICD-10-CM

## 2025-01-24 PROCEDURE — 99214 OFFICE O/P EST MOD 30 MIN: CPT | Performed by: FAMILY MEDICINE

## 2025-01-24 PROCEDURE — 1125F AMNT PAIN NOTED PAIN PRSNT: CPT | Performed by: FAMILY MEDICINE

## 2025-01-24 PROCEDURE — 83013 H PYLORI (C-13) BREATH: CPT

## 2025-01-24 RX ORDER — PANTOPRAZOLE SODIUM 40 MG/1
40 TABLET, DELAYED RELEASE ORAL 2 TIMES DAILY
Qty: 60 TABLET | Refills: 0 | Status: SHIPPED | OUTPATIENT
Start: 2025-01-24

## 2025-01-24 NOTE — PROGRESS NOTES
Subjective     Chief Complaint  Heartburn (Wanting H polri)    Subjective          Yobani Singer is a 29 y.o. male who presents today to [facility] for initial evaluation .    HPI:   Heartburn  He complains of abdominal pain, belching and heartburn. He reports no chest pain or no choking. The heartburn is located in the left chest and LUQ. The heartburn is of moderate intensity. The heartburn wakes him from sleep. The symptoms are aggravated by stress, certain foods and ETOH. He has tried an antacid, a PPI and medication cessation for the symptoms. The treatment provided mild relief.           The following portions of the patient's history were reviewed and updated as appropriate: allergies, current medications, past family history, past medical history, past social history, past surgical history and problem list.    Objective     Objective     Allergy:   Allergies   Allergen Reactions    Azithromycin Hives        Current Medications:   Current Outpatient Medications   Medication Sig Dispense Refill    methylphenidate (Daytrana) 15 MG/9HR Place 1 patch on the skin as directed by provider Daily wear patch for 9 hours only each day (Patient not taking: Reported on 1/24/2025) 30 patch 0    pantoprazole (Protonix) 40 MG EC tablet Take 1 tablet by mouth 2 (Two) Times a Day. 60 tablet 0     No current facility-administered medications for this visit.       Past Medical History:  Past Medical History:   Diagnosis Date    ADHD (attention deficit hyperactivity disorder)     GERD (gastroesophageal reflux disease)        Past Surgical History:  No past surgical history on file.    Social History:  Social History     Socioeconomic History    Marital status: Single   Tobacco Use    Smoking status: Former     Types: Electronic Cigarette     Passive exposure: Past    Smokeless tobacco: Never   Vaping Use    Vaping status: Former    Substances: Nicotine, Flavoring    Devices: Disposable  "  Substance and Sexual Activity    Alcohol use: Yes     Comment: OCCASIONALLY    Drug use: No    Sexual activity: Defer       Family History:  Family History   Problem Relation Age of Onset    JULIA disease Father     ADD / ADHD Neg Hx     Alcohol abuse Neg Hx     Anxiety disorder Neg Hx     Bipolar disorder Neg Hx     Dementia Neg Hx     Depression Neg Hx     Drug abuse Neg Hx     OCD Neg Hx     Paranoid behavior Neg Hx     Schizophrenia Neg Hx     Seizures Neg Hx     Self-Injurious Behavior  Neg Hx     Suicide Attempts Neg Hx        Review of Systems:  Review of Systems   Respiratory:  Negative for choking.    Cardiovascular:  Negative for chest pain.   Gastrointestinal:  Positive for abdominal pain and heartburn.       Vital Signs:   /60 (BP Location: Right arm, Patient Position: Sitting, Cuff Size: Adult)   Pulse 80   Temp 97.8 °F (36.6 °C) (Temporal)   Resp 20   Ht 176.5 cm (69.49\")   Wt 83.4 kg (183 lb 12.8 oz)   SpO2 97%   BMI 26.76 kg/m²      Physical Exam:  Physical Exam  Vitals reviewed.   Constitutional:       Appearance: Normal appearance.   Cardiovascular:      Rate and Rhythm: Normal rate.   Pulmonary:      Effort: Pulmonary effort is normal.   Abdominal:      Tenderness: There is abdominal tenderness.   Neurological:      Mental Status: He is alert.   Psychiatric:         Mood and Affect: Mood normal.         Thought Content: Thought content normal.               PHQ-9 Score  PHQ-9 Total Score:      Lab Review  No visits with results within 3 Month(s) from this visit.   Latest known visit with results is:   Office Visit on 08/30/2023   Component Date Value Ref Range Status    Report Summary 08/30/2023 FINAL   Final    Comment: ====================================================================  [facility]  ====================================================================  Test                             Result       Flag       Units  Drug Present and Declared for " Prescription Verification    Amphetamine                    >6494        EXPECTED   ng/mg creat     Amphetamine is available as a schedule II prescription drug.  Drug Present not Declared for Prescription Verification    Acetaminophen                  PRESENT      UNEXPECTED    Naproxen                       PRESENT      UNEXPECTED  ====================================================================  Test                      Result    Flag   Units      Ref Range    Creatinine              154              mg/dL      >=20  ====================================================================  Declared Medications:   The flagging and interpretation on this report are based on the   following declared medications.  Unexpected                            results may arise from   inaccuracies in the declared medications.   **Note: The testing scope of this panel includes these medications:   Amphetamine (Adderall)   Amphetamine (Adderall XR)   **Note: The testing scope of this panel does not include following   reported medications:   Pantoprazole  ====================================================================  For clinical consultation, please call [phone].  ====================================================================          Radiology Results        Assessment / Plan         Assessment and Plan   Diagnoses and all orders for this visit:    1. Gastroesophageal reflux disease without esophagitis (Primary)  Assessment & Plan:  Suspect GERD vs Gastritis vs h pylor.   Rx Protonix 40 mg BID x 1 month.   H pylori breath test ordered  Discussed reflux precautions     Orders:  -     H. Pylori Breath Test - Breath, Lung; Future  -     pantoprazole (Protonix) 40 MG EC tablet; Take 1 tablet by mouth 2 (Two) Times a Day.  Dispense: 60 tablet; Refill: 0        Discussed possible differential diagnoses, testing, treatment, recommended non-pharmacological interventions, risks, warning signs to monitor for that  would indicate need for follow-up in clinic or ER. If no improvement with these regimens or you have new or worsening symptoms follow-up. Patient verbalizes understanding and agreement with plan of care. Denies further needs or concerns.     Patient was given instructions and counseling regarding her condition and for health maintenance advised.    BMI is >= 25 and <30. (Overweight) The following options were offered after discussion;: weight loss educational material (shared in after visit summary)       Health Maintenance  Health Maintenance:   Health Maintenance Due   Topic Date Due    TDAP/TD VACCINES (1 - Tdap) Never done    ANNUAL PHYSICAL  Never done    INFLUENZA VACCINE  Never done    COVID-19 Vaccine (1 - 2024-25 season) Never done        Meds ordered during this visit  New Medications Ordered This Visit   Medications    pantoprazole (Protonix) 40 MG EC tablet     Sig: Take 1 tablet by mouth 2 (Two) Times a Day.     Dispense:  60 tablet     Refill:  0       Meds stopped during this visit:  There are no discontinued medications.     Visit Diagnoses    ICD-10-CM ICD-9-CM   1. Gastroesophageal reflux disease without esophagitis  K21.9 530.81       Patient was given instructions and counseling regarding his condition or for health maintenance advice. Please see specific information pulled into the AVS if appropriate.     Follow Up   No follow-ups on file.          This document has been electronically signed by Aurora Hong DO   January 24, 2025 13:15 EST    Dictated Utilizing Dragon Dictation: Part of this note may be an electronic transcription/translation of spoken language to printed text using the Dragon Dictation System.    Aurora Hong D.O.  [facility]

## 2025-01-24 NOTE — ASSESSMENT & PLAN NOTE
Suspect GERD vs Gastritis vs h pylor.   Rx Protonix 40 mg BID x 1 month.   H pylori breath test ordered  Discussed reflux precautions

## 2025-01-27 LAB — UREA BREATH TEST QL: NEGATIVE

## 2025-02-20 DIAGNOSIS — K21.9 GASTROESOPHAGEAL REFLUX DISEASE WITHOUT ESOPHAGITIS: ICD-10-CM

## 2025-02-20 RX ORDER — PANTOPRAZOLE SODIUM 40 MG/1
40 TABLET, DELAYED RELEASE ORAL 2 TIMES DAILY
Qty: 30 TABLET | Refills: 0 | Status: SHIPPED | OUTPATIENT
Start: 2025-02-20

## 2025-03-25 ENCOUNTER — OFFICE VISIT (OUTPATIENT)
Dept: FAMILY MEDICINE CLINIC | Facility: CLINIC | Age: 30
End: 2025-03-25
Payer: COMMERCIAL

## 2025-03-25 VITALS
WEIGHT: 187 LBS | TEMPERATURE: 98.9 F | DIASTOLIC BLOOD PRESSURE: 72 MMHG | RESPIRATION RATE: 21 BRPM | HEART RATE: 91 BPM | OXYGEN SATURATION: 99 % | SYSTOLIC BLOOD PRESSURE: 104 MMHG | HEIGHT: 69 IN | BODY MASS INDEX: 27.7 KG/M2

## 2025-03-25 DIAGNOSIS — H91.92 ACUTE HEARING LOSS OF LEFT EAR: Primary | ICD-10-CM

## 2025-03-25 DIAGNOSIS — H69.92 DYSFUNCTION OF LEFT EUSTACHIAN TUBE: ICD-10-CM

## 2025-03-25 DIAGNOSIS — H61.22 IMPACTED CERUMEN OF LEFT EAR: ICD-10-CM

## 2025-03-25 RX ORDER — PREDNISONE 10 MG/1
TABLET ORAL
Qty: 15 TABLET | Refills: 0 | Status: SHIPPED | OUTPATIENT
Start: 2025-03-25

## 2025-03-25 RX ORDER — FLUTICASONE PROPIONATE 50 MCG
1 SPRAY, SUSPENSION (ML) NASAL DAILY
Qty: 16 G | Refills: 11 | Status: SHIPPED | OUTPATIENT
Start: 2025-03-25

## 2025-03-25 NOTE — PROGRESS NOTES
"  Established Patient Office Visit        Subjective      Chief Complaint:  Ear Fullness (Left ear fullness causing hearing difficulties x 3 days)      History of Present Illness: Yobani Singer is a 29 y.o. male who presents for fullness and decreased hearing to the left ear. Some fullness to the left ear.  He states he has almost no hearing to the left ear only to very loud sounds he had pop to the left ear that improved his hearing. Having some sneezing and itchy eyes       Patient Active Problem List   Diagnosis   • Attention deficit hyperactivity disorder (ADHD), predominantly inattentive type   • Gastroesophageal reflux disease without esophagitis         Current Outpatient Medications:   •  fluticasone (FLONASE) 50 MCG/ACT nasal spray, Administer 1 spray into the nostril(s) as directed by provider Daily., Disp: 16 g, Rfl: 11  •  predniSONE (DELTASONE) 10 MG tablet, Take 2 tablets po daily for 5 days. Take 1 tablet po daily  for 5 days., Disp: 15 tablet, Rfl: 0       Objective     Physical Exam:   Vital Signs:   /72   Pulse 91   Temp 98.9 °F (37.2 °C) (Temporal)   Resp 21   Ht 176.5 cm (69.49\")   Wt 84.8 kg (187 lb)   SpO2 99%   BMI 27.23 kg/m²      Physical Exam  Constitutional:       General: He is not in acute distress.     Appearance: He is not ill-appearing.   Cobblestoning oropharynx  Unable to visualize the left TM due to cerumen      Ear Cerumen Removal    Date/Time: 3/25/2025 2:38 PM    Performed by: Doroteo Najera MD  Authorized by: Doroteo Najera MD  Consent: Verbal consent obtained  Risks and benefits: risks, benefits and alternatives were discussed  Consent given by: patient  Location details: left ear  Comments: Initially left ear was irrigated with 1 part water to 1 part hydrogen peroxide without success.  Lighted curette then used to remove small amount of cerumen however cerumen deep in the ear canal remained and procedure was discontinued.  No complication  Procedure type: " instrumentation, irrigation           Assessment / Plan      Assessment/Plan:   Diagnoses and all orders for this visit:    1. Acute hearing loss of left ear (Primary)  -     Cancel: Ambulatory Referral to ENT (Otolaryngology)  -     Ambulatory Referral to ENT (Otolaryngology)  -     fluticasone (FLONASE) 50 MCG/ACT nasal spray; Administer 1 spray into the nostril(s) as directed by provider Daily.  Dispense: 16 g; Refill: 11  -     predniSONE (DELTASONE) 10 MG tablet; Take 2 tablets po daily for 5 days. Take 1 tablet po daily  for 5 days.  Dispense: 15 tablet; Refill: 0    2. Dysfunction of left eustachian tube  -     fluticasone (FLONASE) 50 MCG/ACT nasal spray; Administer 1 spray into the nostril(s) as directed by provider Daily.  Dispense: 16 g; Refill: 11  -     predniSONE (DELTASONE) 10 MG tablet; Take 2 tablets po daily for 5 days. Take 1 tablet po daily  for 5 days.  Dispense: 15 tablet; Refill: 0    3. Impacted cerumen of left ear  -     Ear Cerumen Removal       Dysfunction of the eustachian tube and cerumen can contribute to the hearing loss however given significance of his hearing loss would like him to see the ENT stat.      Follow Up:   No follow-ups on file.    MDM:     Doroteo Najera MD  Family Medicine - [place] [facility] [place]

## 2025-04-16 ENCOUNTER — TELEMEDICINE (OUTPATIENT)
Dept: PSYCHIATRY | Facility: CLINIC | Age: 30
End: 2025-04-16
Payer: COMMERCIAL

## 2025-04-16 DIAGNOSIS — Z79.899 MEDICATION MANAGEMENT: ICD-10-CM

## 2025-04-16 DIAGNOSIS — F90.0 ADHD (ATTENTION DEFICIT HYPERACTIVITY DISORDER), INATTENTIVE TYPE: Primary | Chronic | ICD-10-CM

## 2025-04-16 RX ORDER — DEXTROAMPHETAMINE SACCHARATE, AMPHETAMINE ASPARTATE MONOHYDRATE, DEXTROAMPHETAMINE SULFATE AND AMPHETAMINE SULFATE 3.75; 3.75; 3.75; 3.75 MG/1; MG/1; MG/1; MG/1
15 CAPSULE, EXTENDED RELEASE ORAL EVERY MORNING
Qty: 30 CAPSULE | Refills: 0 | Status: SHIPPED | OUTPATIENT
Start: 2025-04-16

## 2025-04-16 RX ORDER — DEXTROAMPHETAMINE SACCHARATE, AMPHETAMINE ASPARTATE, DEXTROAMPHETAMINE SULFATE AND AMPHETAMINE SULFATE 2.5; 2.5; 2.5; 2.5 MG/1; MG/1; MG/1; MG/1
10 TABLET ORAL DAILY
Qty: 30 TABLET | Refills: 0 | Status: SHIPPED | OUTPATIENT
Start: 2025-04-16

## 2025-04-16 NOTE — PROGRESS NOTES
"Mode of Visit: Video  Location of patient: -HOME-  Location of provider: +HOME+  You have chosen to receive care through a telehealth visit.  The patient has signed the video visit consent form.  The visit included audio and video interaction. No technical issues occurred during this visit.      Chief Complaint  ADHD      Subjective          Yobani Singer presents today via [employer] Video through [employer] for medication management of his ADHD.    History of Present Illness: Patient presents today for follow-up appointment after last being seen on 09/10/2024.  At that appointment he was transitioned from Concerta to Daytrana.  He presents today and reports \"I could be better\".  Patient was last seen in September 2024.  He says following that appointment he made the transition from Concerta to Daytrana, but despite that switch continued to have significant GI distress.  Patient says at that time he decided to stop all medications and focus on treating his stomach.  He saw his primary care who treated him for an ulcer and that seemed to correct all of his issues.  He says he has not had any GI distress since that time.  He says \"I am eating normal food again, and I am sleeping through the night again\".  The patient says he has struggled significantly from a mental health perspective because he has not been able to treat his ADHD for several months.  He says he is just not doing well.  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  He then sat again in February and just found out last week he did not pass again.  He will sit again in July of this year.  Patient says he has struggled significantly being able to focus with studying and staying on task and says \"I just do not think I am going to be able to do it until I start treating my ADHD again\".  Patient reports some frustration because he is no longer working now as well.  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  He says he is not going to work now until he passes the BAR and will likely have to take out a BAR loan to cover his expenses until that time.  He says his sleep and appetite have both been adequate and he denies any issues or concerns with either.  He denies any concerns today regarding significant depression or anxiety symptoms.  He denies any SI/HI, A/V hallucinations.          The following portions of the patient's history were reviewed and updated as appropriate: allergies, current medications, past family history, past medical history, past social history, past surgical history and problem list.      Current Medications:   Current Outpatient Medications   Medication Sig Dispense Refill    fluticasone (FLONASE) 50 MCG/ACT nasal spray Administer 1 spray into the nostril(s) as directed by provider Daily. 16 g 11    amphetamine-dextroamphetamine (Adderall) 10 MG tablet Take 1 tablet by mouth Daily. 30 tablet 0    amphetamine-dextroamphetamine XR (Adderall XR) 15 MG 24 hr capsule Take 1 capsule by mouth Every Morning 30 capsule 0     No current facility-administered medications for this visit.       Mental Status Exam:   Hygiene:    MyChart video  Cooperation:  Cooperative  Eye Contact:  Good  Psychomotor Behavior:  Restless  Affect:  Appropriate  Mood: euthymic  Speech:  Normal  Thought Process:  Goal directed  Thought Content:  Mood congruent  Suicidal:  None  Homicidal:  None  Hallucinations:  None  Delusion:  None  Memory:  Intact  Orientation:  Person, Place, Time, and Situation  Reliability:  good  Insight:  Good  Judgement:  Good  Impulse Control:  Good  Physical/Medical Issues:   GERD      Objective   Vital Signs:   There were no vitals taken for this visit.    Physical Exam  Neurological:      Mental Status: He is oriented to person, place, and time. Mental status is at baseline.   Psychiatric:         Behavior: Behavior is cooperative.         Result Review :     The following data was reviewed by: CAROL Cherry on 04/16/2025:    Data reviewed : Previous notes, primary care notes, medication history           Assessment and Plan    Diagnoses and all orders for this visit:    1. ADHD (attention deficit hyperactivity disorder), inattentive type (Primary)  -     amphetamine-dextroamphetamine XR (Adderall XR) 15 MG 24 hr capsule; Take 1 capsule by mouth Every Morning  Dispense: 30 capsule; Refill: 0  -     amphetamine-dextroamphetamine (Adderall) 10 MG tablet; Take 1 tablet by mouth Daily.  Dispense: 30 tablet; Refill: 0  -     Compliance Drug Analysis, Ur - Urine, Clean Catch; Future  -     Urine Drug Screen - Urine, Clean Catch; Future    2. Medication management  -     Compliance Drug Analysis, Ur - Urine, Clean Catch; Future  -     Urine Drug Screen - Urine, Clean Catch; Future         PHQ-9 Score:   PHQ-9 Total Score: (Patient-Rptd) 0       Depression Screening:  Patient screened positive for depression based on a PHQ-9 score of 0 on 4/16/2025. Follow-up recommendations include: Suicide Risk Assessment performed.        Tobacco Cessation:  Patient has denied an present or past tobacco use. No tobacco cessation education necessary.       Impression/Plan:  -This is a follow-up appointment.  Patient presents today to reestablish care after he was seen last in September 2024.  As discussed above he is feeling much better now that his physical health has improved and he is no longer struggling with GI distress.  He is wanting to restart medication.  He feels not treating his ADHD is a primary contributor to why he has been unsuccessful with passing the bar exam.  He does feel the combination of Adderall XR with a short acting booster dose in the afternoon has been the medication regimen that worked best in the past for his ADHD symptoms.  We can restart these today.  He does need to complete a new urine drug screen and one has been ordered for  him to do at [facility].  I will send his medication today and we will follow up in 1 month.  He has been instructed to complete the urine drug screen prior to that appointment, he expresses understanding and is in agreement with this plan.  -Restart Adderall XR 15 mg every morning for ADHD.  -Restart Adderall IR 10 mg every afternoon as needed for ADHD.    -The patient is being prescribed a controlled substance as part of the treatment plan. The patient/guardian has been educated of appropriate use of the medication(s), including risks and side effects such as insomnia, headache, exacerbation of tics, nervousness, irritability, overstimulation, tremor, dizziness, anorexia or change in appetite, nausea, dry mouth, constipation, diarrhea, weight loss, sexual dysfunction, psychotic episodes, seizures, palpitations, tachycardia, hypertension, rare activation (activation of hypomania, toni, and/or suicidal ideations), cardiovascular adverse effects including sudden death (especially patients with pre-existing structural abnormalities often associated with a family history of cardiac disease), may slow normal growth in children, weight gain is reported but not expected, sedation is possible but activation is much more common, metabolic adversities, and overdose among others. Patient/guardian is also informed that the medication is to be used by the patient only, the medication is to be used only as directed, and the medication should not be combined with other substances unless directed by a Provider/Prescriber. The patient/guardian verbalized understanding and agreement with this in their own words, and wish to continue with current treatment plan.  Controlled substance agreement completed and filed in the patient's chart.  -Attempted to review ROWENA, but manual system currently unavailable.  Patient counseled on use of controlled substances.  UDS performed 08/30/2023.  Urine drug screen ordered for  completion at [facility].  -Reviewed available lab work.  -Schedule MyChart video follow-up appointment for 1 month or as needed.    MEDS ORDERED DURING VISIT:  New Medications Ordered This Visit   Medications    amphetamine-dextroamphetamine XR (Adderall XR) 15 MG 24 hr capsule     Sig: Take 1 capsule by mouth Every Morning     Dispense:  30 capsule     Refill:  0    amphetamine-dextroamphetamine (Adderall) 10 MG tablet     Sig: Take 1 tablet by mouth Daily.     Dispense:  30 tablet     Refill:  0       I spent 40 minutes caring for Yobani on this date of service. This time includes time spent by me in the following activities:preparing for the visit, reviewing tests, obtaining and/or reviewing a separately obtained history, performing a medically appropriate examination and/or evaluation , counseling and educating the patient/family/caregiver, ordering medications, tests, or procedures, and documenting information in the medical record  Follow Up   Return in about 1 month (around 5/16/2025), or if symptoms worsen or fail to improve, for Next scheduled follow up, Video visit.  Patient was given instructions and counseling regarding his condition or for health maintenance advice. Please see specific information pulled into the AVS if appropriate.       TREATMENT PLAN/GOALS: Continue supportive psychotherapy efforts and medications as indicated. Treatment and medication options discussed during today's visit. Patient acknowledged and verbally consented to continue with current treatment plan and was educated on the importance of compliance with treatment and follow-up appointments.    MEDICATION ISSUES:  Discussed medication options and treatment plan of prescribed medication as well as the risks, benefits, and side effects including potential falls, possible impaired driving and metabolic adversities among others. Patient is agreeable to call the office with any worsening of symptoms or onset of side effects. Patient  is agreeable to call 911 or go to the nearest ER should he/she begin having SI/HI.          This document has been electronically signed by CAROL Joshua, PMHNP-BC  April 16, 2025 10:04 EDT      Part of this note may be an electronic transcription/translation of spoken language to printed text using the Dragon Dictation System.

## 2025-05-21 ENCOUNTER — LAB (OUTPATIENT)
Dept: LAB | Facility: HOSPITAL | Age: 30
End: 2025-05-21
Payer: COMMERCIAL

## 2025-05-21 ENCOUNTER — TELEMEDICINE (OUTPATIENT)
Dept: PSYCHIATRY | Facility: CLINIC | Age: 30
End: 2025-05-21
Payer: COMMERCIAL

## 2025-05-21 DIAGNOSIS — F90.0 ADHD (ATTENTION DEFICIT HYPERACTIVITY DISORDER), INATTENTIVE TYPE: Primary | Chronic | ICD-10-CM

## 2025-05-21 DIAGNOSIS — F90.0 ADHD (ATTENTION DEFICIT HYPERACTIVITY DISORDER), INATTENTIVE TYPE: ICD-10-CM

## 2025-05-21 DIAGNOSIS — Z79.899 MEDICATION MANAGEMENT: ICD-10-CM

## 2025-05-21 LAB
AMPHET+METHAMPHET UR QL: POSITIVE
AMPHETAMINES UR QL: NEGATIVE
BARBITURATES UR QL SCN: NEGATIVE
BENZODIAZ UR QL SCN: NEGATIVE
BUPRENORPHINE SERPL-MCNC: NEGATIVE NG/ML
CANNABINOIDS SERPL QL: NEGATIVE
COCAINE UR QL: NEGATIVE
FENTANYL UR-MCNC: NEGATIVE NG/ML
METHADONE UR QL SCN: NEGATIVE
OPIATES UR QL: NEGATIVE
OXYCODONE UR QL SCN: NEGATIVE
PCP UR QL SCN: NEGATIVE
TRICYCLICS UR QL SCN: NEGATIVE

## 2025-05-21 PROCEDURE — 1160F RVW MEDS BY RX/DR IN RCRD: CPT | Performed by: NURSE PRACTITIONER

## 2025-05-21 PROCEDURE — 1159F MED LIST DOCD IN RCRD: CPT | Performed by: NURSE PRACTITIONER

## 2025-05-21 PROCEDURE — 80307 DRUG TEST PRSMV CHEM ANLYZR: CPT

## 2025-05-21 PROCEDURE — 99213 OFFICE O/P EST LOW 20 MIN: CPT | Performed by: NURSE PRACTITIONER

## 2025-05-21 RX ORDER — DEXTROAMPHETAMINE SACCHARATE, AMPHETAMINE ASPARTATE MONOHYDRATE, DEXTROAMPHETAMINE SULFATE AND AMPHETAMINE SULFATE 3.75; 3.75; 3.75; 3.75 MG/1; MG/1; MG/1; MG/1
15 CAPSULE, EXTENDED RELEASE ORAL EVERY MORNING
Qty: 30 CAPSULE | Refills: 0 | Status: SHIPPED | OUTPATIENT
Start: 2025-05-21

## 2025-05-21 RX ORDER — DEXTROAMPHETAMINE SACCHARATE, AMPHETAMINE ASPARTATE, DEXTROAMPHETAMINE SULFATE AND AMPHETAMINE SULFATE 2.5; 2.5; 2.5; 2.5 MG/1; MG/1; MG/1; MG/1
10 TABLET ORAL DAILY
Qty: 30 TABLET | Refills: 0 | Status: SHIPPED | OUTPATIENT
Start: 2025-05-21

## 2025-05-21 NOTE — PROGRESS NOTES
"Mode of Visit: Video  Location of patient: -HOME-  Location of provider: +HOME+  You have chosen to receive care through a telehealth visit.  The patient has signed the video visit consent form.  The visit included audio and video interaction. No technical issues occurred during this visit.      Chief Complaint  ADHD      Subjective              Yobani Singer presents today via [employer] through [employer] for medication management of his ADHD.    History of Present Illness: Patient presents today for follow-up appointment after last being seen on 04/16/2025.  At that appointment he was restarted on Adderall for his ADHD.  Patient presents today and reports \"I am doing pretty good\".  He reports he went to a [facility] today and completed his urine drug screen that was ordered.  Patient says he has not been doing much, just \"hanging out\".  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  Patient says he is looking to hire a  and once that happens he will begin studying on a regular basis.  Patient says his parents are going to help him financially and he did not have to take out any further loans to pay his bills.  He says restarting Adderall at his last appointment has been beneficial but says he has not been taking it every day.  He says he will start doing that next week when he begins studying again.  He says he has not had any adverse effects from it, which he was mostly worried about.  He says there has been no impact on his sleep and that his appetite is still good.  He denies any concerns regarding depression or anxiety symptoms.  He says he is still sleeping and eating well and happy with that.  He denies any SI/HI, A/V hallucinations.        The following portions of the patient's history were reviewed and updated as appropriate: allergies, current medications, past family history, past medical history, past social history, past surgical " history and problem list.      Current Medications:   Current Outpatient Medications   Medication Sig Dispense Refill    amphetamine-dextroamphetamine (Adderall) 10 MG tablet Take 1 tablet by mouth Daily. 30 tablet 0    amphetamine-dextroamphetamine XR (Adderall XR) 15 MG 24 hr capsule Take 1 capsule by mouth Every Morning 30 capsule 0    fluticasone (FLONASE) 50 MCG/ACT nasal spray Administer 1 spray into the nostril(s) as directed by provider Daily. 16 g 11     No current facility-administered medications for this visit.       Mental Status Exam:   Hygiene:    MyChart video  Cooperation:  Cooperative  Eye Contact:  Good  Psychomotor Behavior:  Appropriate  Affect:  Appropriate  Mood: euthymic  Speech:  Normal  Thought Process:  Goal directed  Thought Content:  Mood congruent  Suicidal:  None  Homicidal:  None  Hallucinations:  None  Delusion:  None  Memory:  Intact  Orientation:  Person, Place, Time, and Situation  Reliability:  good  Insight:  Good  Judgement:  Good  Impulse Control:  Good  Physical/Medical Issues:   GERD      Objective   Vital Signs:   There were no vitals taken for this visit.    Physical Exam  Neurological:      Mental Status: He is oriented to person, place, and time. Mental status is at baseline.   Psychiatric:         Behavior: Behavior is cooperative.        Result Review :     The following data was reviewed by: CAROL Cherry on 05/21/2025:    Data reviewed : Previous notes, primary care notes, medication history           Assessment and Plan    Diagnoses and all orders for this visit:    1. ADHD (attention deficit hyperactivity disorder), inattentive type (Primary)  -     amphetamine-dextroamphetamine XR (Adderall XR) 15 MG 24 hr capsule; Take 1 capsule by mouth Every Morning  Dispense: 30 capsule; Refill: 0  -     amphetamine-dextroamphetamine (Adderall) 10 MG tablet; Take 1 tablet by mouth Daily.  Dispense: 30 tablet; Refill: 0           PHQ-9 Score:   PHQ-9 Total Score:  (Patient-Rptd) 0       Depression Screening:  Patient screened positive for depression based on a PHQ-9 score of 0 on 5/21/2025. Follow-up recommendations include: Suicide Risk Assessment performed.        Tobacco Cessation:  Patient has denied an present or past tobacco use. No tobacco cessation education necessary.       Impression/Plan:  -This is a follow-up appointment.  Patient presents today and reports he has been doing well overall since he was last seen.  As discussed above he was restarted on Adderall at his last appointment and he reports today that he has taken it some, but is not taking it consistently right now.  He reports he will start taking it more consistently once he has a  and is studying on a daily basis.  He does feel after restarting the medication it was beneficial for his ADHD.  He was primarily pleased that he did not have any adverse effects, he was very concerned about a GERD exacerbation.  We will maintain his medication and I have encouraged him to take it consistently.  We will follow up in 3 months, he is in agreement with this plan.  - Maintain Adderall XR 15 mg every morning for ADHD.  - Maintain Adderall IR 10 mg every afternoon as needed for ADHD.    -The patient is being prescribed a controlled substance as part of the treatment plan. The patient/guardian has been educated of appropriate use of the medication(s), including risks and side effects such as insomnia, headache, exacerbation of tics, nervousness, irritability, overstimulation, tremor, dizziness, anorexia or change in appetite, nausea, dry mouth, constipation, diarrhea, weight loss, sexual dysfunction, psychotic episodes, seizures, palpitations, tachycardia, hypertension, rare activation (activation of hypomania, toni, and/or suicidal ideations), cardiovascular adverse effects including sudden death (especially patients with pre-existing structural abnormalities often associated with a family history of cardiac  disease), may slow normal growth in children, weight gain is reported but not expected, sedation is possible but activation is much more common, metabolic adversities, and overdose among others. Patient/guardian is also informed that the medication is to be used by the patient only, the medication is to be used only as directed, and the medication should not be combined with other substances unless directed by a Provider/Prescriber. The patient/guardian verbalized understanding and agreement with this in their own words, and wish to continue with current treatment plan.  Controlled substance agreement completed and filed in the patient's chart.  -Attempted to review ROWENA, but manual system currently unavailable.  Patient counseled on use of controlled substances.  UDS performed 05/21/2025.  -Reviewed available lab work.  -Schedule MyChart video follow-up appointment for 3 months or as needed.    MEDS ORDERED DURING VISIT:  New Medications Ordered This Visit   Medications    amphetamine-dextroamphetamine XR (Adderall XR) 15 MG 24 hr capsule     Sig: Take 1 capsule by mouth Every Morning     Dispense:  30 capsule     Refill:  0    amphetamine-dextroamphetamine (Adderall) 10 MG tablet     Sig: Take 1 tablet by mouth Daily.     Dispense:  30 tablet     Refill:  0         Follow Up   Return in about 3 months (around 8/21/2025), or if symptoms worsen or fail to improve, for Next scheduled follow up, Video visit.  Patient was given instructions and counseling regarding his condition or for health maintenance advice. Please see specific information pulled into the AVS if appropriate.       TREATMENT PLAN/GOALS: Continue supportive psychotherapy efforts and medications as indicated. Treatment and medication options discussed during today's visit. Patient acknowledged and verbally consented to continue with current treatment plan and was educated on the importance of compliance with treatment and follow-up  appointments.    MEDICATION ISSUES:  Discussed medication options and treatment plan of prescribed medication as well as the risks, benefits, and side effects including potential falls, possible impaired driving and metabolic adversities among others. Patient is agreeable to call the office with any worsening of symptoms or onset of side effects. Patient is agreeable to call 911 or go to the nearest ER should he/she begin having SI/HI.          This document has been electronically signed by CAROL Joshua, PMHNP-BC  May 21, 2025 12:06 EDT      Part of this note may be an electronic transcription/translation of spoken language to printed text using the Dragon Dictation System.

## 2025-06-19 DIAGNOSIS — F90.0 ADHD (ATTENTION DEFICIT HYPERACTIVITY DISORDER), INATTENTIVE TYPE: Chronic | ICD-10-CM

## 2025-06-19 DIAGNOSIS — H91.92 ACUTE HEARING LOSS OF LEFT EAR: ICD-10-CM

## 2025-06-19 DIAGNOSIS — H69.92 DYSFUNCTION OF LEFT EUSTACHIAN TUBE: ICD-10-CM

## 2025-06-19 RX ORDER — FLUTICASONE PROPIONATE 50 MCG
1 SPRAY, SUSPENSION (ML) NASAL DAILY
Qty: 16 G | Refills: 11 | Status: CANCELLED | OUTPATIENT
Start: 2025-06-19

## 2025-06-19 RX ORDER — DEXTROAMPHETAMINE SACCHARATE, AMPHETAMINE ASPARTATE MONOHYDRATE, DEXTROAMPHETAMINE SULFATE AND AMPHETAMINE SULFATE 3.75; 3.75; 3.75; 3.75 MG/1; MG/1; MG/1; MG/1
15 CAPSULE, EXTENDED RELEASE ORAL EVERY MORNING
Qty: 30 CAPSULE | Refills: 0 | Status: SHIPPED | OUTPATIENT
Start: 2025-06-19

## 2025-06-19 RX ORDER — DEXTROAMPHETAMINE SACCHARATE, AMPHETAMINE ASPARTATE, DEXTROAMPHETAMINE SULFATE AND AMPHETAMINE SULFATE 2.5; 2.5; 2.5; 2.5 MG/1; MG/1; MG/1; MG/1
10 TABLET ORAL DAILY
Qty: 30 TABLET | Refills: 0 | Status: SHIPPED | OUTPATIENT
Start: 2025-06-19

## 2025-07-17 DIAGNOSIS — F90.0 ADHD (ATTENTION DEFICIT HYPERACTIVITY DISORDER), INATTENTIVE TYPE: Chronic | ICD-10-CM

## 2025-07-18 RX ORDER — DEXTROAMPHETAMINE SACCHARATE, AMPHETAMINE ASPARTATE, DEXTROAMPHETAMINE SULFATE AND AMPHETAMINE SULFATE 2.5; 2.5; 2.5; 2.5 MG/1; MG/1; MG/1; MG/1
10 TABLET ORAL DAILY
Qty: 30 TABLET | Refills: 0 | Status: SHIPPED | OUTPATIENT
Start: 2025-07-18

## 2025-07-18 RX ORDER — DEXTROAMPHETAMINE SACCHARATE, AMPHETAMINE ASPARTATE MONOHYDRATE, DEXTROAMPHETAMINE SULFATE AND AMPHETAMINE SULFATE 3.75; 3.75; 3.75; 3.75 MG/1; MG/1; MG/1; MG/1
15 CAPSULE, EXTENDED RELEASE ORAL EVERY MORNING
Qty: 30 CAPSULE | Refills: 0 | Status: SHIPPED | OUTPATIENT
Start: 2025-07-18

## 2025-07-18 NOTE — TELEPHONE ENCOUNTER
Unable to review ROWENA due to manual review.  Refill approved.   What Type Of Note Output Would You Prefer (Optional)?: Bullet Format Hpi Title: Evaluation of Skin Lesions How Severe Are Your Spot(S)?: moderate Have Your Spot(S) Been Treated In The Past?: has not been treated Additional History: ETM SEEn\\nPt is here for FSE with no chin concerns today.

## 2025-08-17 DIAGNOSIS — F90.0 ADHD (ATTENTION DEFICIT HYPERACTIVITY DISORDER), INATTENTIVE TYPE: Chronic | ICD-10-CM

## 2025-08-18 RX ORDER — DEXTROAMPHETAMINE SACCHARATE, AMPHETAMINE ASPARTATE MONOHYDRATE, DEXTROAMPHETAMINE SULFATE AND AMPHETAMINE SULFATE 3.75; 3.75; 3.75; 3.75 MG/1; MG/1; MG/1; MG/1
15 CAPSULE, EXTENDED RELEASE ORAL EVERY MORNING
Qty: 30 CAPSULE | Refills: 0 | Status: SHIPPED | OUTPATIENT
Start: 2025-08-18

## 2025-08-18 RX ORDER — DEXTROAMPHETAMINE SACCHARATE, AMPHETAMINE ASPARTATE, DEXTROAMPHETAMINE SULFATE AND AMPHETAMINE SULFATE 2.5; 2.5; 2.5; 2.5 MG/1; MG/1; MG/1; MG/1
10 TABLET ORAL DAILY
Qty: 30 TABLET | Refills: 0 | Status: SHIPPED | OUTPATIENT
Start: 2025-08-18

## 2025-08-21 ENCOUNTER — TELEMEDICINE (OUTPATIENT)
Dept: PSYCHIATRY | Facility: CLINIC | Age: 30
End: 2025-08-21
Payer: COMMERCIAL

## 2025-08-21 DIAGNOSIS — F90.0 ADHD (ATTENTION DEFICIT HYPERACTIVITY DISORDER), INATTENTIVE TYPE: Primary | Chronic | ICD-10-CM

## 2025-08-21 PROCEDURE — 1160F RVW MEDS BY RX/DR IN RCRD: CPT | Performed by: NURSE PRACTITIONER

## 2025-08-21 PROCEDURE — 99213 OFFICE O/P EST LOW 20 MIN: CPT | Performed by: NURSE PRACTITIONER

## 2025-08-21 PROCEDURE — 1159F MED LIST DOCD IN RCRD: CPT | Performed by: NURSE PRACTITIONER
